# Patient Record
Sex: FEMALE | Race: WHITE | Employment: OTHER | URBAN - METROPOLITAN AREA
[De-identification: names, ages, dates, MRNs, and addresses within clinical notes are randomized per-mention and may not be internally consistent; named-entity substitution may affect disease eponyms.]

---

## 2022-01-17 ENCOUNTER — APPOINTMENT (OUTPATIENT)
Dept: CT IMAGING | Age: 77
End: 2022-01-17
Payer: MEDICARE

## 2022-01-17 ENCOUNTER — HOSPITAL ENCOUNTER (EMERGENCY)
Age: 77
Discharge: HOME | End: 2022-01-17
Payer: MEDICARE

## 2022-01-17 VITALS
SYSTOLIC BLOOD PRESSURE: 110 MMHG | OXYGEN SATURATION: 94 % | DIASTOLIC BLOOD PRESSURE: 58 MMHG | TEMPERATURE: 98 F | HEART RATE: 64 BPM | RESPIRATION RATE: 20 BRPM

## 2022-01-17 DIAGNOSIS — N30.00 ACUTE CYSTITIS WITHOUT HEMATURIA: ICD-10-CM

## 2022-01-17 DIAGNOSIS — R19.7 DIARRHEA, UNSPECIFIED TYPE: Primary | ICD-10-CM

## 2022-01-17 DIAGNOSIS — K64.9 BLEEDING HEMORRHOID: ICD-10-CM

## 2022-01-17 LAB
A/G RATIO: 1.5 (ref 1.1–2.2)
ABO/RH: NORMAL
ALBUMIN SERPL-MCNC: 3.4 G/DL (ref 3.4–5)
ALP BLD-CCNC: 69 U/L (ref 40–129)
ALT SERPL-CCNC: 9 U/L (ref 10–40)
ANION GAP SERPL CALCULATED.3IONS-SCNC: 9 MMOL/L (ref 3–16)
ANTIBODY SCREEN: NORMAL
AST SERPL-CCNC: 15 U/L (ref 15–37)
BACTERIA: ABNORMAL /HPF
BASOPHILS ABSOLUTE: 0 K/UL (ref 0–0.2)
BASOPHILS RELATIVE PERCENT: 0.5 %
BILIRUB SERPL-MCNC: 0.8 MG/DL (ref 0–1)
BILIRUBIN URINE: NEGATIVE
BLOOD, URINE: ABNORMAL
BUN BLDV-MCNC: 18 MG/DL (ref 7–20)
CALCIUM SERPL-MCNC: 9.2 MG/DL (ref 8.3–10.6)
CHLORIDE BLD-SCNC: 105 MMOL/L (ref 99–110)
CLARITY: ABNORMAL
CO2: 25 MMOL/L (ref 21–32)
COLOR: YELLOW
CREAT SERPL-MCNC: 1.1 MG/DL (ref 0.6–1.2)
EOSINOPHILS ABSOLUTE: 0.1 K/UL (ref 0–0.6)
EOSINOPHILS RELATIVE PERCENT: 0.9 %
EPITHELIAL CELLS, UA: 2 /HPF (ref 0–5)
GFR AFRICAN AMERICAN: 58
GFR NON-AFRICAN AMERICAN: 48
GLUCOSE BLD-MCNC: 115 MG/DL (ref 70–99)
GLUCOSE URINE: NEGATIVE MG/DL
HCT VFR BLD CALC: 33.1 % (ref 36–48)
HEMOGLOBIN: 10.6 G/DL (ref 12–16)
KETONES, URINE: NEGATIVE MG/DL
LEUKOCYTE ESTERASE, URINE: ABNORMAL
LIPASE: 21 U/L (ref 13–60)
LYMPHOCYTES ABSOLUTE: 2.1 K/UL (ref 1–5.1)
LYMPHOCYTES RELATIVE PERCENT: 22.6 %
MCH RBC QN AUTO: 27 PG (ref 26–34)
MCHC RBC AUTO-ENTMCNC: 32 G/DL (ref 31–36)
MCV RBC AUTO: 84.4 FL (ref 80–100)
MICROSCOPIC EXAMINATION: YES
MONOCYTES ABSOLUTE: 0.3 K/UL (ref 0–1.3)
MONOCYTES RELATIVE PERCENT: 3.5 %
NEUTROPHILS ABSOLUTE: 6.7 K/UL (ref 1.7–7.7)
NEUTROPHILS RELATIVE PERCENT: 72.5 %
NITRITE, URINE: NEGATIVE
PDW BLD-RTO: 16 % (ref 12.4–15.4)
PH UA: 6.5 (ref 5–8)
PLATELET # BLD: 208 K/UL (ref 135–450)
PMV BLD AUTO: 7.5 FL (ref 5–10.5)
POTASSIUM REFLEX MAGNESIUM: 4.1 MMOL/L (ref 3.5–5.1)
PROTEIN UA: 30 MG/DL
RBC # BLD: 3.92 M/UL (ref 4–5.2)
RBC UA: ABNORMAL /HPF (ref 0–4)
SODIUM BLD-SCNC: 139 MMOL/L (ref 136–145)
SPECIFIC GRAVITY UA: 1.01 (ref 1–1.03)
TOTAL PROTEIN: 5.7 G/DL (ref 6.4–8.2)
URINE REFLEX TO CULTURE: YES
URINE TYPE: ABNORMAL
UROBILINOGEN, URINE: 0.2 E.U./DL
WBC # BLD: 9.2 K/UL (ref 4–11)
WBC UA: >900 /HPF (ref 0–5)

## 2022-01-17 PROCEDURE — 81001 URINALYSIS AUTO W/SCOPE: CPT

## 2022-01-17 PROCEDURE — 87086 URINE CULTURE/COLONY COUNT: CPT

## 2022-01-17 PROCEDURE — 86850 RBC ANTIBODY SCREEN: CPT

## 2022-01-17 PROCEDURE — 6370000000 HC RX 637 (ALT 250 FOR IP): Performed by: PHYSICIAN ASSISTANT

## 2022-01-17 PROCEDURE — 99283 EMERGENCY DEPT VISIT LOW MDM: CPT

## 2022-01-17 PROCEDURE — 83690 ASSAY OF LIPASE: CPT

## 2022-01-17 PROCEDURE — 80053 COMPREHEN METABOLIC PANEL: CPT

## 2022-01-17 PROCEDURE — 86901 BLOOD TYPING SEROLOGIC RH(D): CPT

## 2022-01-17 PROCEDURE — 86900 BLOOD TYPING SEROLOGIC ABO: CPT

## 2022-01-17 PROCEDURE — 74176 CT ABD & PELVIS W/O CONTRAST: CPT

## 2022-01-17 PROCEDURE — 85025 COMPLETE CBC W/AUTO DIFF WBC: CPT

## 2022-01-17 RX ORDER — CEFDINIR 300 MG/1
300 CAPSULE ORAL 2 TIMES DAILY
Qty: 14 CAPSULE | Refills: 0 | Status: SHIPPED | OUTPATIENT
Start: 2022-01-17 | End: 2022-01-24

## 2022-01-17 RX ORDER — CEFDINIR 300 MG/1
300 CAPSULE ORAL ONCE
Status: COMPLETED | OUTPATIENT
Start: 2022-01-17 | End: 2022-01-17

## 2022-01-17 RX ADMIN — CEFDINIR 300 MG: 300 CAPSULE ORAL at 21:20

## 2022-01-18 NOTE — ED PROVIDER NOTES
[facility]      CHIEF COMPLAINT  Diarrhea (with some blood in stool)      SHARED SERVICE VISIT  Evaluated by TANIYA. My supervising physician was available for consultation. HISTORY OF PRESENT ILLNESS  Vonda Sun is a 64 y.o. female presents to the ED for evaluation of diarrhea. Patient states over the past week she developed constipation and started taking laxatives. She states that she has a history of hemorrhoids and attempted to manually disimpact herself. Following that attempt she did notice a small amount of bright red blood and irritation to her hemorrhoid. No history of GI bleeds in the past no anticoagulation. Patient proceeded to consume the whole bottle of laxatives over the week and now over the past 4 days she has developed diarrhea. She states that she continues to have diarrhea over the past few days and does wear depends regularly. She states that now over the past few days she has started taking Imodium to try to stop the diarrhea. No fevers chills or abdominal pain no abdominal tenderness whatsoever. No urinary concerns. No dysuria or increased frequency. Patient states she has been attempting to hydrate herself with water regularly. States that the bright red but has not resolved. No other complaints, modifying factors or associated symptoms. Nursing notes reviewed. Past Medical History:   Diagnosis Date    Acid reflux     Anxiety     Depression     Hiatal hernia     Hyperlipidemia     Hypertension     MI (myocardial infarction) ([facility] [address] 2000, 2003    Thyroid disease     Vertigo      Past Surgical History:   Procedure Laterality Date    ANGIOPLASTY      CARDIAC SURGERY      bypass and stent    HYSTERECTOMY       No family history on file. Social History     Socioeconomic History    Marital status:       Spouse name: Not on file    Number of children: Not on file    Years of education: Not on file    Highest education level: Not on file   Occupational History    Not on file   Tobacco Use    Smoking status: Not on file    Smokeless tobacco: Not on file   Substance and Sexual Activity    Alcohol use: Not on file    Drug use: Not on file    Sexual activity: Not on file   Other Topics Concern    Not on file   Social History Narrative    Not on file     Social Determinants of Health     Financial Resource Strain:     Difficulty of Paying Living Expenses: Not on file   Food Insecurity:     Worried About Running Out of Food in the Last Year: Not on file    Cyn of Food in the Last Year: Not on file   Transportation Needs:     Lack of Transportation (Medical): Not on file    Lack of Transportation (Non-Medical):  Not on file   Physical Activity:     Days of Exercise per Week: Not on file    Minutes of Exercise per Session: Not on file   Stress:     Feeling of Stress : Not on file   Social Connections:     Frequency of Communication with Friends and Family: Not on file    Frequency of Social Gatherings with Friends and Family: Not on file    Attends [employer] Services: Not on file    Active Member of [address] [place] [address] or Organizations: Not on file    Attends Club or Organization Meetings: Not on file    Marital Status: Not on file   Intimate Partner Violence:     Fear of Current or Ex-Partner: Not on file    Emotionally Abused: Not on file    Physically Abused: Not on file    Sexually Abused: Not on file   Housing Stability:     Unable to Pay for Housing in the Last Year: Not on file    Number of [place] in the Last Year: Not on file    Unstable Housing in the Last Year: Not on file     Current Facility-Administered Medications   Medication Dose Route Frequency Provider Last Rate Last Admin    cefdinir (OMNICEF) capsule 300 mg  300 mg Oral Once Susana Miller PA-C         Current Outpatient Medications   Medication Sig Dispense Refill    cefdinir (OMNICEF) 300 MG capsule Take 1 capsule by mouth 2 times daily for 7 days 14 capsule 0    metoprolol (TOPROL-XL) 25 MG XL tablet Take 1 tablet by mouth 2 times daily. 30 tablet 0    clopidogrel (PLAVIX) 75 MG tablet Take 1 tablet by mouth daily. Start in 1 week 30 tablet 0    omeprazole (PRILOSEC) 20 MG capsule Take 1 capsule by mouth 2 times daily. 30 capsule 0    topiramate (TOPAMAX) 25 MG tablet Take 25 mg by mouth 2 times daily.  amitriptyline (ELAVIL) 50 MG tablet Take 50 mg by mouth nightly.  benazepril (LOTENSIN) 20 MG tablet Take 20 mg by mouth daily.  simvastatin (ZOCOR) 80 MG tablet Take 80 mg by mouth nightly.  isosorbide mononitrate (IMDUR) 30 MG CR tablet Take 30 mg by mouth daily.  levothyroxine (SYNTHROID) 50 MCG tablet Take 50 mcg by mouth Daily.  triamterene-hydrochlorothiazide (MAXZIDE-25) 37.5-25 MG per tablet Take 1 tablet by mouth daily.  ALPRAZolam (XANAX) 1 MG tablet Take 1 mg by mouth nightly as needed for Sleep. Allergies   Allergen Reactions    Demerol Hcl [Meperidine] Palpitations    Morphine Palpitations       REVIEW OF SYSTEMS  10 systems reviewed, pertinent positives per HPI otherwise noted to be negative    PHYSICAL EXAM  BP (!) 104/46   Pulse 62   Temp 98 °F (36.7 °C) (Temporal)   Resp 18   SpO2 94%   GENERAL APPEARANCE: Awake and alert. Cooperative. HEAD: Normocephalic. Atraumatic. EYES: EOM's grossly intact. ENT: Mucous membranes are moist.   NECK: Supple. HEART: RRR. No murmurs. LUNGS: Respirations unlabored. CTAB. Good air exchange. Speaking comfortably in full sentences. ABDOMEN: Soft. Non-distended. Non-tender. No guarding or rebound. No masses. No organomegaly. RECTAL: External hemorrhoids appreciated with superficial abrasions to the bilateral buttocks. No melena appreciated. EXTREMITIES: No peripheral edema. Moves all extremities equally. All extremities neurovascularly intact. SKIN: Warm and dry. No acute rashes. NEUROLOGICAL: Alert and oriented. CN's 2-12 intact. No gross facial drooping. Strength 5/5, sensation intact. PSYCHIATRIC: Normal mood and affect. RADIOLOGY  CT ABDOMEN PELVIS WO CONTRAST Additional Contrast? None   Final Result   Addendum 1 of 1   ADDENDUM:   The indeterminate left renal lesion was mentioned in the findings but not    the   impression. That may reflect presence of a complex cyst, but a    nonemergent   follow-up dedicated renal MRI or CT is recommended to better characterize   that. Final   No abnormality identified to explain diarrhea. A right ureteral stent is in place with mild right-sided hydronephrosis. Urothelial thickening on the right is noted, along with perinephric,   periureteral, and pericystic fat stranding, findings which are concerning for   inflammation/infection.                 LABS  Labs Reviewed   CBC WITH AUTO DIFFERENTIAL - Abnormal; Notable for the following components:       Result Value    RBC 3.92 (*)     Hemoglobin 10.6 (*)     Hematocrit 33.1 (*)     RDW 16.0 (*)     All other components within normal limits    Narrative:     Performed at:  [facility]  [address] [facility] [address] [address]   Phone [phone]   COMPREHENSIVE METABOLIC PANEL W/ REFLEX TO MG FOR LOW K - Abnormal; Notable for the following components:    Glucose 115 (*)     GFR Non- 48 (*)     GFR  58 (*)     Total Protein 5.7 (*)     ALT 9 (*)     All other components within normal limits    Narrative:     Performed at:  [facility]  [address] [address] [address]   Phone [phone]   URINE RT REFLEX TO CULTURE - Abnormal; Notable for the following components:    Clarity, UA TURBID (*)     Blood, Urine LARGE (*)     Protein, UA 30 (*)     Leukocyte Esterase, Urine LARGE (*)     All other components within normal limits    Narrative:     Performed at:  [facility]  [address] [facility]) [facility] [address]   Phone [phone]   MICROSCOPIC URINALYSIS - Abnormal; Notable for the following components:    Bacteria, UA 2+ (*)     WBC, UA >900 (*)     All other components within normal limits    Narrative:     Performed at:  [facility]  [address] [place] [address]   Phone [phone]   CULTURE, URINE   LIPASE    Narrative:     Performed at:  [facility]  [address] [place] [address]   Phone [phone]   TYPE AND SCREEN    Narrative:     Performed at:  [facility]  [address] [place] [address]   Phone [phone]       PROCEDURES  Unless otherwise noted below, none  Procedures      CRITICAL CARE TIME  The total critical care time spent while evaluating and treating this patient was 0 minutes. This excludes time spent doing separately billable procedures. This includes time at the bedside, data interpretation, medication management, obtaining critical history from collateral sources if the patient is unable to provide it directly, and physician consultation. Specifics of interventions taken and potentially life-threatening diagnostic considerations are listed above in the medical decision making. MDM  MDM  47-year-old female presented the ED for evaluation of diarrhea following consumption of laxatives. Initially had constipation and has history of hemorrhoids with home attempted manual disimpaction. She says she had bright red blood following's episode most likely due to the irritation of her hemorrhoid. No bleeding at this time. On arrival to ED patient vitals are within normal limits with a hemoglobin 10.6 which is at her baseline. No significant showed abnormalities or signs of dehydration. No kidney injury appreciated. BUN 18. Creatinine 1.1. Leukocytosis.   Abdomen soft nontender we will plan to get a CT to rule out any acute intra-abdominal abnormality. Patient's urinalysis was remarkable for urinary tract infection. She has no leukocytosis and afebrile nontachycardic. Nontoxic-appearing. CT was remarkable for concerns for perinephric stranding. Patient has no flank tenderness. And is well-appearing. On exam of her rectum there is hemorrhoid appreciated with a expectation follow-up on the frequent diarrhea. Patient had vomiting here in the emergency department and states there is no blood. I did not appreciate any melena on my exam.  Time most likely causes due to the external hemorrhage given the very bright red in nature as well as the physical exam.  Patient's BUN is 18 as well. Given this urinary tract infection I did offer patient observartion for repeat lab work as well as antibiotics. Patient states that she request to be discharged home. Patient states that she lives with her grandson and plans on contacting her primary care provider on discharge. I do believe this is within reason. Discussed with the patient the risk of discharge and she was able to express understanding of the risk and return precautions. Discussed with her that this could worsen and if she would develop any fevers or pain or nausea vomiting or blood in her stool recommend she return emergency department for reevaluation. Patient was here in the emergency department for 5 hours with no episodes of rectal bleeding or diarrhea. DISPOSITION  Patient was discharged to home in good condition. CLINICAL IMPRESSION  1. Diarrhea, unspecified type    2. Bleeding hemorrhoid    3.  Acute cystitis without hematuria            Abby Page PA-C  01/17/22 4352

## 2022-01-19 LAB — URINE CULTURE, ROUTINE: NORMAL

## 2022-07-03 ENCOUNTER — HOSPITAL ENCOUNTER (EMERGENCY)
Age: 77
Discharge: HOME | End: 2022-07-03
Attending: EMERGENCY MEDICINE
Payer: MEDICARE

## 2022-07-03 ENCOUNTER — APPOINTMENT (OUTPATIENT)
Dept: CT IMAGING | Age: 77
End: 2022-07-03
Payer: MEDICARE

## 2022-07-03 VITALS
RESPIRATION RATE: 16 BRPM | HEART RATE: 65 BPM | TEMPERATURE: 98.1 F | HEIGHT: 64 IN | BODY MASS INDEX: 28.15 KG/M2 | OXYGEN SATURATION: 100 % | WEIGHT: 164.9 LBS | SYSTOLIC BLOOD PRESSURE: 165 MMHG | DIASTOLIC BLOOD PRESSURE: 60 MMHG

## 2022-07-03 DIAGNOSIS — R19.7 DIARRHEA, UNSPECIFIED TYPE: Primary | ICD-10-CM

## 2022-07-03 DIAGNOSIS — Z93.6 NEPHROSTOMY STATUS (HCC): ICD-10-CM

## 2022-07-03 LAB
A/G RATIO: 1.8 (ref 1.1–2.2)
ALBUMIN SERPL-MCNC: 4 G/DL (ref 3.4–5)
ALP BLD-CCNC: 74 U/L (ref 40–129)
ALT SERPL-CCNC: 17 U/L (ref 10–40)
ANION GAP SERPL CALCULATED.3IONS-SCNC: 12 MMOL/L (ref 3–16)
AST SERPL-CCNC: 20 U/L (ref 15–37)
BACTERIA: ABNORMAL /HPF
BASOPHILS ABSOLUTE: 0 K/UL (ref 0–0.2)
BASOPHILS RELATIVE PERCENT: 0.5 %
BILIRUB SERPL-MCNC: 0.4 MG/DL (ref 0–1)
BILIRUBIN URINE: NEGATIVE
BLOOD, URINE: NEGATIVE
BUN BLDV-MCNC: 19 MG/DL (ref 7–20)
C DIFF TOXIN/ANTIGEN: NORMAL
CALCIUM SERPL-MCNC: 9.5 MG/DL (ref 8.3–10.6)
CHLORIDE BLD-SCNC: 99 MMOL/L (ref 99–110)
CLARITY: CLEAR
CO2: 27 MMOL/L (ref 21–32)
COLOR: YELLOW
CREAT SERPL-MCNC: 1.4 MG/DL (ref 0.6–1.2)
EOSINOPHILS ABSOLUTE: 0.1 K/UL (ref 0–0.6)
EOSINOPHILS RELATIVE PERCENT: 1.6 %
EPITHELIAL CELLS, UA: 1 /HPF (ref 0–5)
GFR AFRICAN AMERICAN: 44
GFR NON-AFRICAN AMERICAN: 36
GLUCOSE BLD-MCNC: 112 MG/DL (ref 70–99)
GLUCOSE URINE: NEGATIVE MG/DL
HCT VFR BLD CALC: 31.3 % (ref 36–48)
HEMOGLOBIN: 10.3 G/DL (ref 12–16)
HYALINE CASTS: 2 /LPF (ref 0–8)
KETONES, URINE: NEGATIVE MG/DL
LEUKOCYTE ESTERASE, URINE: ABNORMAL
LIPASE: 52 U/L (ref 13–60)
LYMPHOCYTES ABSOLUTE: 2 K/UL (ref 1–5.1)
LYMPHOCYTES RELATIVE PERCENT: 24.6 %
MCH RBC QN AUTO: 27.2 PG (ref 26–34)
MCHC RBC AUTO-ENTMCNC: 32.8 G/DL (ref 31–36)
MCV RBC AUTO: 83.1 FL (ref 80–100)
MICROSCOPIC EXAMINATION: YES
MONOCYTES ABSOLUTE: 0.5 K/UL (ref 0–1.3)
MONOCYTES RELATIVE PERCENT: 5.6 %
NEUTROPHILS ABSOLUTE: 5.6 K/UL (ref 1.7–7.7)
NEUTROPHILS RELATIVE PERCENT: 67.7 %
NITRITE, URINE: NEGATIVE
PDW BLD-RTO: 15.5 % (ref 12.4–15.4)
PH UA: 6 (ref 5–8)
PLATELET # BLD: 278 K/UL (ref 135–450)
PMV BLD AUTO: 7.7 FL (ref 5–10.5)
POTASSIUM SERPL-SCNC: 3 MMOL/L (ref 3.5–5.1)
PROTEIN UA: NEGATIVE MG/DL
RBC # BLD: 3.77 M/UL (ref 4–5.2)
RBC UA: 2 /HPF (ref 0–4)
SODIUM BLD-SCNC: 138 MMOL/L (ref 136–145)
SPECIFIC GRAVITY UA: 1.01 (ref 1–1.03)
TOTAL PROTEIN: 6.2 G/DL (ref 6.4–8.2)
URINE REFLEX TO CULTURE: ABNORMAL
URINE TYPE: ABNORMAL
UROBILINOGEN, URINE: 0.2 E.U./DL
WBC # BLD: 8.3 K/UL (ref 4–11)
WBC UA: 6 /HPF (ref 0–5)

## 2022-07-03 PROCEDURE — 6370000000 HC RX 637 (ALT 250 FOR IP): Performed by: PHYSICIAN ASSISTANT

## 2022-07-03 PROCEDURE — 80053 COMPREHEN METABOLIC PANEL: CPT

## 2022-07-03 PROCEDURE — 99284 EMERGENCY DEPT VISIT MOD MDM: CPT

## 2022-07-03 PROCEDURE — 81001 URINALYSIS AUTO W/SCOPE: CPT

## 2022-07-03 PROCEDURE — 85025 COMPLETE CBC W/AUTO DIFF WBC: CPT

## 2022-07-03 PROCEDURE — 87449 NOS EACH ORGANISM AG IA: CPT

## 2022-07-03 PROCEDURE — 36415 COLL VENOUS BLD VENIPUNCTURE: CPT

## 2022-07-03 PROCEDURE — 87505 NFCT AGENT DETECTION GI: CPT

## 2022-07-03 PROCEDURE — 74176 CT ABD & PELVIS W/O CONTRAST: CPT

## 2022-07-03 PROCEDURE — 83690 ASSAY OF LIPASE: CPT

## 2022-07-03 PROCEDURE — 87324 CLOSTRIDIUM AG IA: CPT

## 2022-07-03 RX ORDER — LACTOBACILLUS RHAMNOSUS GG 10B CELL
1 CAPSULE ORAL ONCE
Status: COMPLETED | OUTPATIENT
Start: 2022-07-03 | End: 2022-07-03

## 2022-07-03 RX ORDER — SACCHAROMYCES BOULARDII 250 MG
250 CAPSULE ORAL 2 TIMES DAILY
Qty: 14 CAPSULE | Refills: 0 | Status: SHIPPED | OUTPATIENT
Start: 2022-07-03 | End: 2022-07-10

## 2022-07-03 RX ORDER — POTASSIUM CHLORIDE 20 MEQ/1
40 TABLET, EXTENDED RELEASE ORAL ONCE
Status: COMPLETED | OUTPATIENT
Start: 2022-07-03 | End: 2022-07-03

## 2022-07-03 RX ADMIN — POTASSIUM CHLORIDE 40 MEQ: 20 TABLET, EXTENDED RELEASE ORAL at 17:58

## 2022-07-03 RX ADMIN — Medication 1 CAPSULE: at 20:08

## 2022-07-03 ASSESSMENT — PAIN SCALES - GENERAL
PAINLEVEL_OUTOF10: 0
PAINLEVEL_OUTOF10: 0

## 2022-07-03 ASSESSMENT — ENCOUNTER SYMPTOMS
VOMITING: 0
DIARRHEA: 1
SHORTNESS OF BREATH: 0
COLOR CHANGE: 0
BLOOD IN STOOL: 0
ABDOMINAL PAIN: 0

## 2022-07-03 ASSESSMENT — PAIN - FUNCTIONAL ASSESSMENT: PAIN_FUNCTIONAL_ASSESSMENT: NONE - DENIES PAIN

## 2022-07-03 NOTE — ED NOTES
Pt to ED via EMS with c/o diarrhea that started 1 week ago while she was in the hospital at [address] [place] [address] also states she had right kidney stent placed and her nephrostomy tube \"came loose\" several nights ago and has \"not been draining right since\"      Karin Palomares, BRITTNEY  07/03/22 5010

## 2022-07-03 NOTE — ED NOTES
Pt incontinent of moderate amount of liquid brown stool. Melvi care provided, new depends and linen changed. Redness and excoriation noted to coccyx. Area cleansed and sacral mepilex applied. Pt tolerated well. Stool sample obtained and sent to lab as ordered by provider.       Jayashree Wang RN  07/03/22 9378

## 2022-07-03 NOTE — ED PROVIDER NOTES
Attending Supervisory Note/Shared Visit   I have personally performed a face to face diagnostic evaluation on this patient. I have reviewed the mid-levels findings and agree. History and Exam by me shows an alert white female no acute distress. Transported by EMS. Ongoing diarrhea for the last week worse today. Was just in the hospital at [facility].  Apparently unable to place a renal stent on the right and had a nephrostomy tube placed. The tube came apart several nights ago and they had to reattach it. She does not think it pulled out. Since then she does not think there is been as much urine output. She has about 350 cc of urine in the bag now. She states she emptied it around 4 AM.  She states she has not voided any urine from her bladder/urethra. No abdominal pain. General: Alert moderately obese female in no acute distress. Heart: Regular rate and rhythm. No murmurs or gallops noted. Lungs: Breath sounds equal bilaterally and clear. Abdomen: Obese, soft, nontender. No masses organomegaly. Bowel sounds normal.  Nephrostomy tube present in right flank. Lab reviewed. Urinalysis shows 6 white cells and 2 red cells. H&H of 10.3 and 31.3. White blood count 8300 with 68 neutrophils and 25 lymphs. BUN of 19 with a creatinine of 1.4. Potassium of 3.0. Bicarb of 27 with an anion gap of 12. Liver enzymes and lipase are normal.    CT abdomen pelvis without contrast: Right nephrostomy catheter in the middle pole infundibulum with mild hydronephrosis. No comparison images to assess stability. There is mild redundancy of the extrarenal course of the nephrostomy tube in the subcutaneous and retroperitoneal soft tissues. Right nephrolithiasis including 2 punctate calculi at the level of the UPJ. No acute bowel pathology. Discussed with urology. Tube is functioning, it may be repositioned per that discussion.   It can be done on Tuesday as an outpatient by her urologist, or it can be done here if the patient wants to be admitted. No definite indication for admission. Her creatinine is trending around her baseline. The nephrostomy tube is actively draining. Patient will be discharged to follow-up as an outpatient.     (Please note that portions of this note were completed with a voice recognition program.  Efforts were made to edit the dictations but occasionally words are mis-transcribed.)    Mckenzie Moser MD  Attending Emergency Physician        Asad Elliott MD  07/03/22 2003

## 2022-07-03 NOTE — ED PROVIDER NOTES
[address] [facility]      Pt Name: David Izaguirre  MRN: [id number]  [place] 1945  Date of evaluation: 7/3/2022  Provider: DIAMANTE Zhang    This patient was seen and evaluated by the attending physician Va Saini MD.    [address] [facility]       Chief Complaint   Patient presents with    Diarrhea     Pt to ED via EMS with c/o diarrhea that started 1 week ago while she was in the hospital at [address] [place] [address] also states she had right kidney stent placed and her nephrostomy tube \"came loose\" several nights ago and has \"not been draining right since\"     Other       CRITICAL CARE TIME   I performed a total Critical Care time of  31 minutes, excluding separately reportable procedures. There was a high probability of clinically significant/life threatening deterioration in the patient's condition which required my urgent intervention. Not limited to multiple reexaminations, discussions with attending physician and consultants. HISTORY OF PRESENT ILLNESS  (Location/Symptom, Timing/Onset, Context/Setting, Quality, Duration, Modifying Factors, Severity.)   David Izaguirre is a 76 y.o. female who presents to the emergency department and of diarrhea and concern for her nephrostomy tube. Her son is at the bedside. She has a history of diarrhea but states that this episode started a couple weeks ago. She was admitted to the [facility] in June 20 sees Dr. Toney Marquez of allergy. She tells me she had 9 stents in her right kidney in the past with history of UPJ obstruction. She states that they attempted to replace the stent but were unsuccessful. The stent was removed. Consequently she had a nephrostomy tube placed. She states that a couple nights ago somehow the tubing got pulled and had to take part of it back together she feels like has not been draining well today.   She states she emptied at 4:30 AM and has only had (TOPROL-XL) 25 MG XL tablet Take 1 tablet by mouth 2 times daily. , Disp-30 tablet, R-0      clopidogrel (PLAVIX) 75 MG tablet Take 1 tablet by mouth daily. Start in 1 week, Disp-30 tablet, R-0      omeprazole (PRILOSEC) 20 MG capsule Take 1 capsule by mouth 2 times daily. , Disp-30 capsule, R-0      topiramate (TOPAMAX) 25 MG tablet Take 25 mg by mouth 2 times daily. amitriptyline (ELAVIL) 50 MG tablet Take 50 mg by mouth nightly. benazepril (LOTENSIN) 20 MG tablet Take 20 mg by mouth daily. simvastatin (ZOCOR) 80 MG tablet Take 80 mg by mouth nightly. isosorbide mononitrate (IMDUR) 30 MG CR tablet Take 30 mg by mouth daily. levothyroxine (SYNTHROID) 50 MCG tablet Take 50 mcg by mouth Daily. triamterene-hydrochlorothiazide (MAXZIDE-25) 37.5-25 MG per tablet Take 1 tablet by mouth daily. ALPRAZolam (XANAX) 1 MG tablet Take 1 mg by mouth nightly as needed for Sleep. ALLERGIES     Demerol hcl [meperidine] and Morphine    FAMILY HISTORY     History reviewed. No pertinent family history. No family status information on file. SOCIAL HISTORY      reports that she has never smoked. She has never used smokeless tobacco. She reports previous alcohol use. She reports previous drug use. PHYSICAL EXAM    (up to 7 for level 4, 8 or more for level 5)     ED Triage Vitals [07/03/22 1615]   BP Temp Temp Source Heart Rate Resp SpO2 Height Weight   (!) 158/51 98.4 °F (36.9 °C) Oral 62 15 97 % 5' 4\" (1.626 m) 164 lb 14.5 oz (74.8 kg)       Physical Exam  Vitals and nursing note reviewed. Constitutional:       Appearance: Normal appearance. HENT:      Head: Normocephalic and atraumatic. Mouth/Throat:      Mouth: Mucous membranes are moist.   Eyes:      Pupils: Pupils are equal, round, and reactive to light. Cardiovascular:      Rate and Rhythm: Normal rate. Pulses: Normal pulses. Pulmonary:      Effort: Pulmonary effort is normal. No respiratory distress. Abdominal:      Tenderness: There is no abdominal tenderness. There is no guarding or rebound. Musculoskeletal:         General: Normal range of motion. Cervical back: Normal range of motion. Skin:     General: Skin is warm. Neurological:      General: No focal deficit present. Mental Status: She is alert and oriented to person, place, and time. Psychiatric:         Mood and Affect: Mood normal.         Behavior: Behavior normal.         DIAGNOSTIC RESULTS     RADIOLOGY:   Non-plain film images such as CT, Ultrasound and MRI are read by the radiologist. Plain radiographic images are visualized and preliminarily interpreted by DIAMANTE Foster with the below findings:    Reviewed radiologist's interpretation. Interpretation per the Radiologist below, if available at the time of this note:    CT ABDOMEN PELVIS WO CONTRAST   Preliminary Result   1. Right nephrostomy catheter in a middle pole infundibulum with mild   hydronephrosis. No comparison imaging to assess stability. There is mild   redundancy of the extrarenal course of the nephrostomy catheter in the   subcutaneous and retroperitoneal soft tissues. 2. Right nephrolithiasis including two punctate calculi at level of the UPJ. 3. No acute bowel pathology. Colonic diverticulosis.                LABS:  Labs Reviewed   CBC WITH AUTO DIFFERENTIAL - Abnormal; Notable for the following components:       Result Value    RBC 3.77 (*)     Hemoglobin 10.3 (*)     Hematocrit 31.3 (*)     RDW 15.5 (*)     All other components within normal limits   COMPREHENSIVE METABOLIC PANEL - Abnormal; Notable for the following components:    Potassium 3.0 (*)     Glucose 112 (*)     CREATININE 1.4 (*)     GFR Non- 36 (*)     GFR  44 (*)     Total Protein 6.2 (*)     All other components within normal limits   URINALYSIS WITH REFLEX TO CULTURE - Abnormal; Notable for the following components:    Leukocyte Esterase, Urine SMALL (*)     All other components within normal limits   MICROSCOPIC URINALYSIS - Abnormal; Notable for the following components:    WBC, UA 6 (*)     All other components within normal limits   C DIFF TOXIN/ANTIGEN    Narrative:     ORDER#: [id number]                          ORDERED BY: HALLIE SANTOS  SOURCE: Stool                              COLLECTED:  07/03/22 18:18  ANTIBIOTICS AT [facility].:                      RECEIVED :  07/03/22 18:26  Collect White vial (sterile container)   GASTROINTESTINAL PANEL, MOLECULAR   LIPASE       All other labs were within normal range or not returned as of this dictation. EMERGENCY DEPARTMENT COURSE and DIFFERENTIAL DIAGNOSIS/MDM:   Vitals:    Vitals:    07/03/22 1815 07/03/22 1858 07/03/22 1918 07/03/22 2035   BP: (!) 176/61 (!) 171/51 (!) 170/50 (!) 165/60   Pulse: 60 63 63 65   Resp: 25 17 15 16   Temp:  98.1 °F (36.7 °C)  98.1 °F (36.7 °C)   TempSrc:  Oral  Oral   SpO2:  97% 98% 100%   Weight:       Height:         I discussed with Jostin Carreno and/or family the exam results, diagnosis, care, prognosis, reasons to return and the importance of follow up. Patient and/or family is in full agreement with plan and all questions have been answered. Specific discharge instructions explained, including reasons to return to the emergency department. Jostin Carreno is well appearing, non-toxic, and afebrile at the time of discharge. Patient is afebrile not tachycardic blood pressure elevated 500 systolic on arrival.  Not hypoxic. She denies pain in her flank or abdomen no tenderness on her abdomen. Complaining of diarrhea that is been going on for some time. She is tried over-the-counter medications including Imodium and Pepto-Bismol. She is negative for C. difficile has been on antibiotics recently. Discussed trying a probiotic and following with GI and primary care. The patient would like to go home.   I did contact urology and discussed the case with Dr. Alfa wagner CT scan imaging. On my reevaluation initially she only had 250 cc in her nephrostomy bag however on reevaluation the bag is full. It is draining. Urology advised that the patient as it is draining may need repositioning but that she could follow-up Tuesday with her urologist.  Patient expressed understanding and agreement. She understands to return if the bag stops draining or she develops fevers vomiting or pain. I estimate there is LOW risk for ACUTE APPENDICITIS, BOWEL OBSTRUCTION, CHOLECYSTITIS, DIVERTICULITIS, INCARCERATED HERNIA, PANCREATITIS, PERFORATED BOWEL, BOWEL ISCHEMIA, GONADAL TORSION, OR CARDIAC ISCHEMIA, thus I consider the discharge disposition reasonable. Also, there is no evidence or peritonitis, sepsis, or toxicity. CONSULTS:  IP CONSULT TO UROLOGY    PROCEDURES:  Procedures      FINAL IMPRESSION      1. Diarrhea, unspecified type    2. Nephrostomy status [facility])          DISPOSITION/PLAN   DISPOSITION Decision To Discharge 07/03/2022 07:59:58 PM      PATIENT REFERRED TO:  Rona Mon MD  [address] [place] [address].   [address] [place] [address]  [phone]    Call in 2 days  For follow up      DISCHARGE MEDICATIONS:  Discharge Medication List as of 7/3/2022  8:06 PM      START taking these medications    Details   saccharomyces boulardii (FLORASTOR) 250 MG capsule Take 1 capsule by mouth 2 times daily for 7 days, Disp-14 capsule, R-0Print             (Please note that portions of this note were completed with a voice recognition program.  Efforts were made to edit the dictations but occasionally words are mis-transcribed.)    [place], [address], [place]  07/03/22 3096

## 2022-07-04 LAB — GI BACTERIAL PATHOGENS BY PCR: NORMAL

## 2022-07-04 NOTE — ED NOTES
Provider order placed for patient's discharge. Provider reviewed decision to discharge with the patient. Discharge paperwork and any prescriptions were reviewed with the patient. Patient verbalized understanding of discharge education and any prescriptions and has no further questions or further needs at this time. Patient left with all personal belongings and was stable upon departure. Patient thanked for choosing [facility] ([facility]) and informed to return should any need arise.        Divine Moran RN  07/03/22 8379

## 2022-12-13 ENCOUNTER — APPOINTMENT (OUTPATIENT)
Dept: CT IMAGING | Age: 77
DRG: 871 | End: 2022-12-13
Payer: MEDICARE

## 2022-12-13 ENCOUNTER — HOSPITAL ENCOUNTER (INPATIENT)
Age: 77
LOS: 4 days | Discharge: NURSING, REHAB OR LONG-TERM CARE | DRG: 871 | End: 2022-12-17
Attending: EMERGENCY MEDICINE | Admitting: STUDENT IN AN ORGANIZED HEALTH CARE EDUCATION/TRAINING PROGRAM
Payer: MEDICARE

## 2022-12-13 DIAGNOSIS — R65.20 SEVERE SEPSIS (HCC): Primary | ICD-10-CM

## 2022-12-13 DIAGNOSIS — A41.9 SEVERE SEPSIS (HCC): Primary | ICD-10-CM

## 2022-12-13 DIAGNOSIS — J18.9 PNEUMONIA OF RIGHT MIDDLE LOBE DUE TO INFECTIOUS ORGANISM: ICD-10-CM
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
## 2022-12-13 PROBLEM — L89.90 DECUBITAL ULCER: Status: ACTIVE | Noted: 2022-01-01

## 2022-12-13 LAB
A/G RATIO: 0.9 (ref 1.1–2.2)
ALBUMIN SERPL-MCNC: 2.3 G/DL (ref 3.4–5)
ALP BLD-CCNC: 141 U/L (ref 40–129)
ALT SERPL-CCNC: 146 U/L (ref 10–40)
ANION GAP SERPL CALCULATED.3IONS-SCNC: 13 MMOL/L (ref 3–16)
AST SERPL-CCNC: 102 U/L (ref 15–37)
BASE EXCESS VENOUS: -2.9 MMOL/L
BASOPHILS ABSOLUTE: 0 K/UL (ref 0–0.2)
BASOPHILS RELATIVE PERCENT: 0 %
BILIRUB SERPL-MCNC: 0.5 MG/DL (ref 0–1)
BUN BLDV-MCNC: 47 MG/DL (ref 7–20)
CALCIUM SERPL-MCNC: 8.8 MG/DL (ref 8.3–10.6)
CARBOXYHEMOGLOBIN: 1.1 %
CHLORIDE BLD-SCNC: 103 MMOL/L (ref 99–110)
CO2: 22 MMOL/L (ref 21–32)
CREAT SERPL-MCNC: 0.9 MG/DL (ref 0.6–1.2)
EOSINOPHILS ABSOLUTE: 0 K/UL (ref 0–0.6)
EOSINOPHILS RELATIVE PERCENT: 0 %
GFR SERPL CREATININE-BSD FRML MDRD: >60 ML/MIN/{1.73_M2}
GLUCOSE BLD-MCNC: 196 MG/DL (ref 70–99)
HCO3 VENOUS: 21 MMOL/L (ref 23–29)
HCT VFR BLD CALC: 33.2 % (ref 36–48)
HEMOGLOBIN: 10.5 G/DL (ref 12–16)
LACTIC ACID: 3.8 MMOL/L (ref 0.4–2)
LYMPHOCYTES ABSOLUTE: 1.3 K/UL (ref 1–5.1)
LYMPHOCYTES RELATIVE PERCENT: 6.9 %
MCH RBC QN AUTO: 27.6 PG (ref 26–34)
MCHC RBC AUTO-ENTMCNC: 31.7 G/DL (ref 31–36)
MCV RBC AUTO: 87.1 FL (ref 80–100)
METHEMOGLOBIN VENOUS: 0.5 %
MONOCYTES ABSOLUTE: 0.4 K/UL (ref 0–1.3)
MONOCYTES RELATIVE PERCENT: 2.3 %
NEUTROPHILS ABSOLUTE: 17.2 K/UL (ref 1.7–7.7)
NEUTROPHILS RELATIVE PERCENT: 90.8 %
O2 SAT, VEN: 96 %
O2 THERAPY: ABNORMAL
PCO2, VEN: 32.4 MMHG (ref 40–50)
PDW BLD-RTO: 17.1 % (ref 12.4–15.4)
PH VENOUS: 7.42 (ref 7.35–7.45)
PLATELET # BLD: 188 K/UL (ref 135–450)
PMV BLD AUTO: 7.2 FL (ref 5–10.5)
PO2, VEN: 79 MMHG
POTASSIUM REFLEX MAGNESIUM: 4.2 MMOL/L (ref 3.5–5.1)
PRO-BNP: ABNORMAL PG/ML (ref 0–449)
RBC # BLD: 3.81 M/UL (ref 4–5.2)
SARS-COV-2, NAAT: NOT DETECTED
SODIUM BLD-SCNC: 138 MMOL/L (ref 136–145)
TCO2 CALC VENOUS: 22 MMOL/L
TOTAL PROTEIN: 5 G/DL (ref 6.4–8.2)
WBC # BLD: 18.9 K/UL (ref 4–11)

## 2022-12-13 PROCEDURE — 99285 EMERGENCY DEPT VISIT HI MDM: CPT

## 2022-12-13 PROCEDURE — 2580000003 HC RX 258: Performed by: EMERGENCY MEDICINE

## 2022-12-13 PROCEDURE — 83605 ASSAY OF LACTIC ACID: CPT

## 2022-12-13 PROCEDURE — 6360000002 HC RX W HCPCS: Performed by: NURSE PRACTITIONER

## 2022-12-13 PROCEDURE — 6370000000 HC RX 637 (ALT 250 FOR IP): Performed by: NURSE PRACTITIONER

## 2022-12-13 PROCEDURE — 87635 SARS-COV-2 COVID-19 AMP PRB: CPT

## 2022-12-13 PROCEDURE — 85025 COMPLETE CBC W/AUTO DIFF WBC: CPT

## 2022-12-13 PROCEDURE — 83880 ASSAY OF NATRIURETIC PEPTIDE: CPT

## 2022-12-13 PROCEDURE — 93005 ELECTROCARDIOGRAM TRACING: CPT | Performed by: EMERGENCY MEDICINE

## 2022-12-13 PROCEDURE — 1200000000 HC SEMI PRIVATE

## 2022-12-13 PROCEDURE — 80053 COMPREHEN METABOLIC PANEL: CPT

## 2022-12-13 PROCEDURE — 82803 BLOOD GASES ANY COMBINATION: CPT

## 2022-12-13 PROCEDURE — 71250 CT THORAX DX C-: CPT

## 2022-12-13 RX ORDER — HYDROCODONE BITARTRATE AND ACETAMINOPHEN 5; 325 MG/1; MG/1
1 TABLET ORAL EVERY 6 HOURS PRN
COMMUNITY

## 2022-12-13 RX ORDER — CASTOR OIL AND BALSAM, PERU 788; 87 MG/G; MG/G
1 OINTMENT TOPICAL 2 TIMES DAILY
COMMUNITY

## 2022-12-13 RX ORDER — HYDRALAZINE HYDROCHLORIDE 25 MG/1
25 TABLET, FILM COATED ORAL 3 TIMES DAILY
COMMUNITY

## 2022-12-13 RX ORDER — ASCORBIC ACID 500 MG
1000 TABLET ORAL DAILY
COMMUNITY

## 2022-12-13 RX ORDER — SODIUM CHLORIDE 0.9 % (FLUSH) 0.9 %
5-40 SYRINGE (ML) INJECTION PRN
Status: DISCONTINUED | OUTPATIENT
Start: 2022-12-13 | End: 2022-12-17 | Stop reason: HOSPADM

## 2022-12-13 RX ORDER — POTASSIUM CHLORIDE 20 MEQ/1
20 TABLET, EXTENDED RELEASE ORAL DAILY
COMMUNITY

## 2022-12-13 RX ORDER — CARVEDILOL 6.25 MG/1
6.25 TABLET ORAL 2 TIMES DAILY WITH MEALS
Status: DISCONTINUED | OUTPATIENT
Start: 2022-12-13 | End: 2022-12-17 | Stop reason: HOSPADM

## 2022-12-13 RX ORDER — SODIUM CHLORIDE 0.9 % (FLUSH) 0.9 %
5-40 SYRINGE (ML) INJECTION EVERY 12 HOURS SCHEDULED
Status: DISCONTINUED | OUTPATIENT
Start: 2022-12-13 | End: 2022-12-17 | Stop reason: HOSPADM

## 2022-12-13 RX ORDER — FUROSEMIDE 10 MG/ML
40 INJECTION INTRAMUSCULAR; INTRAVENOUS 2 TIMES DAILY
Status: DISCONTINUED | OUTPATIENT
Start: 2022-12-14 | End: 2022-12-16

## 2022-12-13 RX ORDER — ATORVASTATIN CALCIUM 40 MG/1
40 TABLET, FILM COATED ORAL
COMMUNITY

## 2022-12-13 RX ORDER — PANTOPRAZOLE SODIUM 40 MG/1
40 TABLET, DELAYED RELEASE ORAL
Status: DISCONTINUED | OUTPATIENT
Start: 2022-12-14 | End: 2022-12-17 | Stop reason: HOSPADM

## 2022-12-13 RX ORDER — HYDRALAZINE HYDROCHLORIDE 25 MG/1
25 TABLET, FILM COATED ORAL 3 TIMES DAILY
Status: DISCONTINUED | OUTPATIENT
Start: 2022-12-13 | End: 2022-12-17 | Stop reason: HOSPADM

## 2022-12-13 RX ORDER — HYDROXYZINE HYDROCHLORIDE 25 MG/1
25 TABLET, FILM COATED ORAL EVERY 8 HOURS PRN
Status: DISCONTINUED | OUTPATIENT
Start: 2022-12-13 | End: 2022-12-17 | Stop reason: HOSPADM

## 2022-12-13 RX ORDER — FUROSEMIDE 10 MG/ML
40 INJECTION INTRAMUSCULAR; INTRAVENOUS ONCE
Status: COMPLETED | OUTPATIENT
Start: 2022-12-13 | End: 2022-12-13

## 2022-12-13 RX ORDER — CARVEDILOL 6.25 MG/1
6.25 TABLET ORAL 2 TIMES DAILY WITH MEALS
COMMUNITY

## 2022-12-13 RX ORDER — SODIUM HYPOCHLORITE 1.25 MG/ML
1 SOLUTION TOPICAL DAILY
COMMUNITY

## 2022-12-13 RX ORDER — ATORVASTATIN CALCIUM 40 MG/1
40 TABLET, FILM COATED ORAL
Status: DISCONTINUED | OUTPATIENT
Start: 2022-12-13 | End: 2022-12-17 | Stop reason: HOSPADM

## 2022-12-13 RX ORDER — ACETAMINOPHEN 325 MG/1
650 TABLET ORAL EVERY 6 HOURS PRN
Status: DISCONTINUED | OUTPATIENT
Start: 2022-12-13 | End: 2022-12-17 | Stop reason: HOSPADM

## 2022-12-13 RX ORDER — CLOPIDOGREL BISULFATE 75 MG/1
75 TABLET ORAL DAILY
COMMUNITY

## 2022-12-13 RX ORDER — SODIUM CHLORIDE 9 MG/ML
INJECTION, SOLUTION INTRAVENOUS PRN
Status: DISCONTINUED | OUTPATIENT
Start: 2022-12-13 | End: 2022-12-17 | Stop reason: HOSPADM

## 2022-12-13 RX ORDER — PANTOPRAZOLE SODIUM 40 MG/1
40 TABLET, DELAYED RELEASE ORAL DAILY
COMMUNITY

## 2022-12-13 RX ORDER — CLOPIDOGREL BISULFATE 75 MG/1
75 TABLET ORAL DAILY
Status: DISCONTINUED | OUTPATIENT
Start: 2022-12-14 | End: 2022-12-17 | Stop reason: HOSPADM

## 2022-12-13 RX ORDER — NABUMETONE 500 MG/1
500 TABLET, FILM COATED ORAL DAILY PRN
COMMUNITY

## 2022-12-13 RX ORDER — ACETAMINOPHEN 650 MG/1
650 SUPPOSITORY RECTAL EVERY 6 HOURS PRN
Status: DISCONTINUED | OUTPATIENT
Start: 2022-12-13 | End: 2022-12-17 | Stop reason: HOSPADM

## 2022-12-13 RX ORDER — AZITHROMYCIN 250 MG/1
250 TABLET, FILM COATED ORAL DAILY
COMMUNITY
End: 2022-12-13

## 2022-12-13 RX ORDER — GUAIFENESIN 600 MG/1
1200 TABLET, EXTENDED RELEASE ORAL 2 TIMES DAILY
COMMUNITY

## 2022-12-13 RX ORDER — LEVOTHYROXINE SODIUM 88 UG/1
88 TABLET ORAL DAILY
Status: DISCONTINUED | OUTPATIENT
Start: 2022-12-14 | End: 2022-12-17 | Stop reason: HOSPADM

## 2022-12-13 RX ORDER — MULTIVIT-MIN/IRON/FOLIC ACID/K 18-600-40
1 CAPSULE ORAL DAILY
COMMUNITY

## 2022-12-13 RX ORDER — IPRATROPIUM BROMIDE AND ALBUTEROL SULFATE 2.5; .5 MG/3ML; MG/3ML
1 SOLUTION RESPIRATORY (INHALATION) 3 TIMES DAILY PRN
Status: DISCONTINUED | OUTPATIENT
Start: 2022-12-13 | End: 2022-12-17 | Stop reason: HOSPADM

## 2022-12-13 RX ORDER — TOPIRAMATE 25 MG/1
50 TABLET ORAL NIGHTLY
Status: DISCONTINUED | OUTPATIENT
Start: 2022-12-13 | End: 2022-12-17 | Stop reason: HOSPADM

## 2022-12-13 RX ORDER — IPRATROPIUM BROMIDE AND ALBUTEROL SULFATE 2.5; .5 MG/3ML; MG/3ML
1 SOLUTION RESPIRATORY (INHALATION) 3 TIMES DAILY PRN
COMMUNITY

## 2022-12-13 RX ORDER — TOPIRAMATE 25 MG/1
25 TABLET ORAL
Status: DISCONTINUED | OUTPATIENT
Start: 2022-12-14 | End: 2022-12-17 | Stop reason: HOSPADM

## 2022-12-13 RX ORDER — NIFEDIPINE 30 MG/1
30 TABLET, EXTENDED RELEASE ORAL DAILY
Status: DISCONTINUED | OUTPATIENT
Start: 2022-12-14 | End: 2022-12-17 | Stop reason: HOSPADM

## 2022-12-13 RX ORDER — TRAZODONE HYDROCHLORIDE 100 MG/1
100 TABLET ORAL NIGHTLY
Status: DISCONTINUED | OUTPATIENT
Start: 2022-12-13 | End: 2022-12-17 | Stop reason: HOSPADM

## 2022-12-13 RX ORDER — TORSEMIDE 20 MG/1
20 TABLET ORAL DAILY
COMMUNITY

## 2022-12-13 RX ORDER — NIFEDIPINE 30 MG/1
30 TABLET, FILM COATED, EXTENDED RELEASE ORAL DAILY
COMMUNITY

## 2022-12-13 RX ORDER — TRAZODONE HYDROCHLORIDE 100 MG/1
100 TABLET ORAL NIGHTLY
COMMUNITY

## 2022-12-13 RX ORDER — HYDROXYZINE HYDROCHLORIDE 25 MG/1
25 TABLET, FILM COATED ORAL EVERY 8 HOURS PRN
COMMUNITY

## 2022-12-13 RX ORDER — ZINC GLUCONATE 50 MG
50 TABLET ORAL DAILY
COMMUNITY

## 2022-12-13 RX ORDER — LEVOTHYROXINE SODIUM 88 UG/1
88 TABLET ORAL DAILY
COMMUNITY

## 2022-12-13 RX ORDER — ISOSORBIDE MONONITRATE 60 MG/1
60 TABLET, EXTENDED RELEASE ORAL DAILY
COMMUNITY

## 2022-12-13 RX ORDER — 0.9 % SODIUM CHLORIDE 0.9 %
30 INTRAVENOUS SOLUTION INTRAVENOUS ONCE
Status: COMPLETED | OUTPATIENT
Start: 2022-12-13 | End: 2022-12-14

## 2022-12-13 RX ORDER — ENOXAPARIN SODIUM 100 MG/ML
40 INJECTION SUBCUTANEOUS DAILY
Status: DISCONTINUED | OUTPATIENT
Start: 2022-12-14 | End: 2022-12-17 | Stop reason: HOSPADM

## 2022-12-13 RX ORDER — DEXAMETHASONE 6 MG/1
6 TABLET ORAL
COMMUNITY

## 2022-12-13 RX ORDER — HYDROCODONE BITARTRATE AND ACETAMINOPHEN 5; 325 MG/1; MG/1
1 TABLET ORAL ONCE
Status: COMPLETED | OUTPATIENT
Start: 2022-12-13 | End: 2022-12-13

## 2022-12-13 RX ORDER — TOPIRAMATE 25 MG/1
50 TABLET ORAL
COMMUNITY

## 2022-12-13 RX ADMIN — FUROSEMIDE 40 MG: 10 INJECTION, SOLUTION INTRAMUSCULAR; INTRAVENOUS at 21:42

## 2022-12-13 RX ADMIN — HYDROCODONE BITARTRATE AND ACETAMINOPHEN 1 TABLET: 5; 325 TABLET ORAL at 22:22

## 2022-12-13 RX ADMIN — SODIUM CHLORIDE 1641 ML: 9 INJECTION, SOLUTION INTRAVENOUS at 21:45

## 2022-12-13 ASSESSMENT — LIFESTYLE VARIABLES
HOW MANY STANDARD DRINKS CONTAINING ALCOHOL DO YOU HAVE ON A TYPICAL DAY: PATIENT DOES NOT DRINK
HOW OFTEN DO YOU HAVE A DRINK CONTAINING ALCOHOL: NEVER

## 2022-12-13 ASSESSMENT — ENCOUNTER SYMPTOMS
ABDOMINAL PAIN: 0
RHINORRHEA: 0
NAUSEA: 0
WHEEZING: 0
VOMITING: 0
SORE THROAT: 0
CHEST TIGHTNESS: 0
DIARRHEA: 0
SHORTNESS OF BREATH: 1
COUGH: 1
CONSTIPATION: 0

## 2022-12-13 ASSESSMENT — PAIN SCALES - GENERAL
PAINLEVEL_OUTOF10: 8
PAINLEVEL_OUTOF10: 10
PAINLEVEL_OUTOF10: 10

## 2022-12-13 ASSESSMENT — PAIN DESCRIPTION - LOCATION: LOCATION: GENERALIZED

## 2022-12-13 ASSESSMENT — PAIN - FUNCTIONAL ASSESSMENT
PAIN_FUNCTIONAL_ASSESSMENT: 0-10
PAIN_FUNCTIONAL_ASSESSMENT: PREVENTS OR INTERFERES WITH ALL ACTIVE AND SOME PASSIVE ACTIVITIES

## 2022-12-13 ASSESSMENT — PAIN DESCRIPTION - DESCRIPTORS: DESCRIPTORS: ACHING;DISCOMFORT

## 2022-12-13 ASSESSMENT — PAIN DESCRIPTION - PAIN TYPE: TYPE: CHRONIC PAIN

## 2022-12-13 ASSESSMENT — PAIN DESCRIPTION - FREQUENCY: FREQUENCY: CONTINUOUS

## 2022-12-13 ASSESSMENT — PAIN DESCRIPTION - ONSET: ONSET: ON-GOING

## 2022-12-14 LAB
A/G RATIO: 1 (ref 1.1–2.2)
ALBUMIN SERPL-MCNC: 2.3 G/DL (ref 3.4–5)
ALP BLD-CCNC: 115 U/L (ref 40–129)
ALT SERPL-CCNC: 110 U/L (ref 10–40)
ANION GAP SERPL CALCULATED.3IONS-SCNC: 15 MMOL/L (ref 3–16)
AST SERPL-CCNC: 59 U/L (ref 15–37)
BACTERIA: ABNORMAL /HPF
BASOPHILS ABSOLUTE: 0 K/UL (ref 0–0.2)
BASOPHILS RELATIVE PERCENT: 0 %
BILIRUB SERPL-MCNC: 0.5 MG/DL (ref 0–1)
BILIRUBIN URINE: NEGATIVE
BLOOD, URINE: ABNORMAL
BUDDING YEAST: PRESENT
BUN BLDV-MCNC: 42 MG/DL (ref 7–20)
C-REACTIVE PROTEIN: 226 MG/L (ref 0–5.1)
CALCIUM SERPL-MCNC: 8.9 MG/DL (ref 8.3–10.6)
CHLORIDE BLD-SCNC: 104 MMOL/L (ref 99–110)
CLARITY: ABNORMAL
CO2: 20 MMOL/L (ref 21–32)
COLOR: YELLOW
CREAT SERPL-MCNC: 0.8 MG/DL (ref 0.6–1.2)
EKG ATRIAL RATE: 84 BPM
EKG DIAGNOSIS: NORMAL
EKG P AXIS: 37 DEGREES
EKG P-R INTERVAL: 216 MS
EKG Q-T INTERVAL: 358 MS
EKG QRS DURATION: 86 MS
EKG QTC CALCULATION (BAZETT): 423 MS
EKG R AXIS: 0 DEGREES
EKG T AXIS: 153 DEGREES
EKG VENTRICULAR RATE: 84 BPM
EOSINOPHILS ABSOLUTE: 0 K/UL (ref 0–0.6)
EOSINOPHILS RELATIVE PERCENT: 0 %
EPITHELIAL CELLS, UA: 2 /HPF (ref 0–5)
GFR SERPL CREATININE-BSD FRML MDRD: >60 ML/MIN/{1.73_M2}
GLUCOSE BLD-MCNC: 120 MG/DL (ref 70–99)
GLUCOSE URINE: NEGATIVE MG/DL
HCT VFR BLD CALC: 30.4 % (ref 36–48)
HEMOGLOBIN: 9.6 G/DL (ref 12–16)
HYALINE CASTS: 8 /LPF (ref 0–8)
HYALINE CASTS: PRESENT
HYPHAL YEAST: PRESENT
KETONES, URINE: NEGATIVE MG/DL
LACTIC ACID, SEPSIS: 1.1 MMOL/L (ref 0.4–1.9)
LACTIC ACID, SEPSIS: 1.5 MMOL/L (ref 0.4–1.9)
LEUKOCYTE ESTERASE, URINE: ABNORMAL
LYMPHOCYTES ABSOLUTE: 1.5 K/UL (ref 1–5.1)
LYMPHOCYTES RELATIVE PERCENT: 9.7 %
MCH RBC QN AUTO: 27.6 PG (ref 26–34)
MCHC RBC AUTO-ENTMCNC: 31.6 G/DL (ref 31–36)
MCV RBC AUTO: 87.2 FL (ref 80–100)
MICROSCOPIC EXAMINATION: YES
MONOCYTES ABSOLUTE: 0.6 K/UL (ref 0–1.3)
MONOCYTES RELATIVE PERCENT: 4 %
NEUTROPHILS ABSOLUTE: 13.7 K/UL (ref 1.7–7.7)
NEUTROPHILS RELATIVE PERCENT: 86.3 %
NITRITE, URINE: NEGATIVE
PDW BLD-RTO: 16.9 % (ref 12.4–15.4)
PH UA: 5.5 (ref 5–8)
PLATELET # BLD: 151 K/UL (ref 135–450)
PMV BLD AUTO: 7.6 FL (ref 5–10.5)
POTASSIUM REFLEX MAGNESIUM: 4 MMOL/L (ref 3.5–5.1)
PROCALCITONIN: 5.08 NG/ML (ref 0–0.15)
PROTEIN UA: 30 MG/DL
RBC # BLD: 3.49 M/UL (ref 4–5.2)
RBC UA: 50 /HPF (ref 0–4)
SODIUM BLD-SCNC: 139 MMOL/L (ref 136–145)
SPECIFIC GRAVITY UA: 1.01 (ref 1–1.03)
TOTAL PROTEIN: 4.5 G/DL (ref 6.4–8.2)
URINE REFLEX TO CULTURE: YES
URINE TYPE: ABNORMAL
UROBILINOGEN, URINE: 0.2 E.U./DL
WBC # BLD: 15.9 K/UL (ref 4–11)
WBC UA: 873 /HPF (ref 0–5)
YEAST HYPHAE, UR: PRESENT

## 2022-12-14 PROCEDURE — 6370000000 HC RX 637 (ALT 250 FOR IP): Performed by: INTERNAL MEDICINE

## 2022-12-14 PROCEDURE — 85025 COMPLETE CBC W/AUTO DIFF WBC: CPT

## 2022-12-14 PROCEDURE — 97166 OT EVAL MOD COMPLEX 45 MIN: CPT

## 2022-12-14 PROCEDURE — 2580000003 HC RX 258: Performed by: NURSE PRACTITIONER

## 2022-12-14 PROCEDURE — 97161 PT EVAL LOW COMPLEX 20 MIN: CPT

## 2022-12-14 PROCEDURE — 84145 PROCALCITONIN (PCT): CPT

## 2022-12-14 PROCEDURE — 94760 N-INVAS EAR/PLS OXIMETRY 1: CPT

## 2022-12-14 PROCEDURE — 87040 BLOOD CULTURE FOR BACTERIA: CPT

## 2022-12-14 PROCEDURE — 81001 URINALYSIS AUTO W/SCOPE: CPT

## 2022-12-14 PROCEDURE — 36415 COLL VENOUS BLD VENIPUNCTURE: CPT

## 2022-12-14 PROCEDURE — 6370000000 HC RX 637 (ALT 250 FOR IP): Performed by: NURSE PRACTITIONER

## 2022-12-14 PROCEDURE — 87086 URINE CULTURE/COLONY COUNT: CPT

## 2022-12-14 PROCEDURE — 83605 ASSAY OF LACTIC ACID: CPT

## 2022-12-14 PROCEDURE — 92610 EVALUATE SWALLOWING FUNCTION: CPT

## 2022-12-14 PROCEDURE — 93010 ELECTROCARDIOGRAM REPORT: CPT | Performed by: INTERNAL MEDICINE

## 2022-12-14 PROCEDURE — 1200000000 HC SEMI PRIVATE

## 2022-12-14 PROCEDURE — 80053 COMPREHEN METABOLIC PANEL: CPT

## 2022-12-14 PROCEDURE — 6360000002 HC RX W HCPCS: Performed by: NURSE PRACTITIONER

## 2022-12-14 PROCEDURE — 97535 SELF CARE MNGMENT TRAINING: CPT

## 2022-12-14 PROCEDURE — 86140 C-REACTIVE PROTEIN: CPT

## 2022-12-14 PROCEDURE — 97530 THERAPEUTIC ACTIVITIES: CPT

## 2022-12-14 PROCEDURE — 87449 NOS EACH ORGANISM AG IA: CPT

## 2022-12-14 RX ORDER — DICYCLOMINE HYDROCHLORIDE 10 MG/1
10 CAPSULE ORAL 3 TIMES DAILY PRN
Status: DISCONTINUED | OUTPATIENT
Start: 2022-12-14 | End: 2022-12-17 | Stop reason: HOSPADM

## 2022-12-14 RX ADMIN — NIFEDIPINE 30 MG: 30 TABLET, EXTENDED RELEASE ORAL at 08:15

## 2022-12-14 RX ADMIN — SODIUM CHLORIDE: 9 INJECTION, SOLUTION INTRAVENOUS at 00:56

## 2022-12-14 RX ADMIN — TRAZODONE HYDROCHLORIDE 100 MG: 100 TABLET ORAL at 00:54

## 2022-12-14 RX ADMIN — ATORVASTATIN CALCIUM 40 MG: 40 TABLET, FILM COATED ORAL at 00:53

## 2022-12-14 RX ADMIN — HYDRALAZINE HYDROCHLORIDE 25 MG: 25 TABLET, FILM COATED ORAL at 22:29

## 2022-12-14 RX ADMIN — SODIUM CHLORIDE: 9 INJECTION, SOLUTION INTRAVENOUS at 22:52

## 2022-12-14 RX ADMIN — SODIUM CHLORIDE, PRESERVATIVE FREE 10 ML: 5 INJECTION INTRAVENOUS at 04:29

## 2022-12-14 RX ADMIN — ACETAMINOPHEN 650 MG: 325 TABLET, FILM COATED ORAL at 00:54

## 2022-12-14 RX ADMIN — LEVOTHYROXINE SODIUM 88 MCG: 0.09 TABLET ORAL at 05:33

## 2022-12-14 RX ADMIN — HYDRALAZINE HYDROCHLORIDE 25 MG: 25 TABLET, FILM COATED ORAL at 08:15

## 2022-12-14 RX ADMIN — CLOPIDOGREL BISULFATE 75 MG: 75 TABLET ORAL at 08:15

## 2022-12-14 RX ADMIN — TOPIRAMATE 25 MG: 25 TABLET, FILM COATED ORAL at 05:33

## 2022-12-14 RX ADMIN — CARVEDILOL 6.25 MG: 6.25 TABLET, FILM COATED ORAL at 08:15

## 2022-12-14 RX ADMIN — ENOXAPARIN SODIUM 40 MG: 100 INJECTION SUBCUTANEOUS at 08:15

## 2022-12-14 RX ADMIN — VANCOMYCIN HYDROCHLORIDE 1250 MG: 1.25 INJECTION, POWDER, LYOPHILIZED, FOR SOLUTION INTRAVENOUS at 01:02

## 2022-12-14 RX ADMIN — CARVEDILOL 6.25 MG: 6.25 TABLET, FILM COATED ORAL at 00:53

## 2022-12-14 RX ADMIN — TOPIRAMATE 50 MG: 25 TABLET, FILM COATED ORAL at 22:38

## 2022-12-14 RX ADMIN — SODIUM CHLORIDE, PRESERVATIVE FREE 10 ML: 5 INJECTION INTRAVENOUS at 08:25

## 2022-12-14 RX ADMIN — FUROSEMIDE 40 MG: 10 INJECTION, SOLUTION INTRAMUSCULAR; INTRAVENOUS at 17:33

## 2022-12-14 RX ADMIN — TOPIRAMATE 50 MG: 25 TABLET, FILM COATED ORAL at 00:54

## 2022-12-14 RX ADMIN — HYDRALAZINE HYDROCHLORIDE 25 MG: 25 TABLET, FILM COATED ORAL at 14:46

## 2022-12-14 RX ADMIN — CEFEPIME 2000 MG: 2 INJECTION, POWDER, FOR SOLUTION INTRAVENOUS at 08:21

## 2022-12-14 RX ADMIN — TRAZODONE HYDROCHLORIDE 100 MG: 100 TABLET ORAL at 22:29

## 2022-12-14 RX ADMIN — FUROSEMIDE 40 MG: 10 INJECTION, SOLUTION INTRAMUSCULAR; INTRAVENOUS at 08:15

## 2022-12-14 RX ADMIN — AZITHROMYCIN DIHYDRATE 500 MG: 500 INJECTION, POWDER, LYOPHILIZED, FOR SOLUTION INTRAVENOUS at 04:28

## 2022-12-14 RX ADMIN — ATORVASTATIN CALCIUM 40 MG: 40 TABLET, FILM COATED ORAL at 22:29

## 2022-12-14 RX ADMIN — SODIUM CHLORIDE, PRESERVATIVE FREE 10 ML: 5 INJECTION INTRAVENOUS at 22:57

## 2022-12-14 RX ADMIN — PANTOPRAZOLE SODIUM 40 MG: 40 TABLET, DELAYED RELEASE ORAL at 05:35

## 2022-12-14 RX ADMIN — CARVEDILOL 6.25 MG: 6.25 TABLET, FILM COATED ORAL at 17:33

## 2022-12-14 RX ADMIN — HYDRALAZINE HYDROCHLORIDE 25 MG: 25 TABLET, FILM COATED ORAL at 00:54

## 2022-12-14 RX ADMIN — DICYCLOMINE HYDROCHLORIDE 10 MG: 10 CAPSULE ORAL at 22:29

## 2022-12-14 RX ADMIN — CEFEPIME 2000 MG: 2 INJECTION, POWDER, FOR SOLUTION INTRAVENOUS at 03:16

## 2022-12-14 RX ADMIN — CEFEPIME 2000 MG: 2 INJECTION, POWDER, FOR SOLUTION INTRAVENOUS at 22:57

## 2022-12-14 RX ADMIN — DICYCLOMINE HYDROCHLORIDE 10 MG: 10 CAPSULE ORAL at 14:46

## 2022-12-14 ASSESSMENT — PAIN SCALES - GENERAL
PAINLEVEL_OUTOF10: 10
PAINLEVEL_OUTOF10: 6
PAINLEVEL_OUTOF10: 10
PAINLEVEL_OUTOF10: 0

## 2022-12-14 ASSESSMENT — PAIN DESCRIPTION - LOCATION
LOCATION: GENERALIZED
LOCATION: ABDOMEN
LOCATION: ABDOMEN

## 2022-12-14 ASSESSMENT — PAIN DESCRIPTION - DESCRIPTORS: DESCRIPTORS: ACHING

## 2022-12-14 NOTE — ED NOTES
Report given to [facility]. SBAR discussed. All questions answered. RN verbalized understanding.       Mike Wiseman RN  12/13/22 2066

## 2022-12-14 NOTE — PROGRESS NOTES
Hospitalist Progress Note      PCP: Ramiro Kuo    Date of Admission: 12/13/2022    Subjective: c/o left shoulder pain and itching, still with SOB, productive cough    Medications:  Reviewed    Infusion Medications    sodium chloride 10 mL/hr at 12/14/22 0056     Scheduled Medications    [START ON 12/15/2022] vancomycin  1,250 mg IntraVENous Q24H    vancomycin (VANCOCIN) intermittent dosing (placeholder)   Other RX Placeholder    azithromycin  500 mg IntraVENous Q24H    cefepime  2,000 mg IntraVENous Q12H    atorvastatin  40 mg Oral QHS    carvedilol  6.25 mg Oral BID WC    clopidogrel  75 mg Oral Daily    hydrALAZINE  25 mg Oral TID    levothyroxine  88 mcg Oral Daily    NIFEdipine  30 mg Oral Daily    pantoprazole  40 mg Oral QAM AC    topiramate  50 mg Oral Nightly    topiramate  25 mg Oral QAM AC    traZODone  100 mg Oral Nightly    furosemide  40 mg IntraVENous BID    sodium chloride flush  5-40 mL IntraVENous 2 times per day    enoxaparin  40 mg SubCUTAneous Daily     PRN Meds: dicyclomine, hydrOXYzine HCl, ipratropium-albuterol, sodium chloride flush, sodium chloride, acetaminophen **OR** acetaminophen    No intake or output data in the 24 hours ending 12/14/22 1625    Physical Exam Performed:    BP (!) 125/54   Pulse (!) 113   Temp 97 °F (36.1 °C) (Axillary)   Resp 20   Ht 5' 4\" (1.626 m)   Wt 154 lb 8.7 oz (70.1 kg)   SpO2 93%   BMI 26.53 kg/m²     General appearance: NAD  Lungs: Respirations are easy regular nonlabored, coarse Rales throughout, congested wet nonproductive cough. .  Heart: Regular rate and rhythm with Normal S1/S2 without murmurs, rubs or gallops, point of maximum impulse non-displaced  Abdomen: Soft, non-tender or non-distended without rigidity or guarding and positive bowel sounds all four quadrants.   Extremities: Poor muscle tone lower extremities, foot drop  Skin: Pale, multiple areas of ecchymosis and bruising to the lower extremities, advanced decubitus sacral ulcer.  Neurologic: Alert and oriented X 3, no focal motor or sensory deficit  Mental status: Alert, oriented, thought content appropriate. Capillary Refill: Acceptable  < 3 seconds  Peripheral Pulses: +3 Easily felt, not easily obliterated with pressure    Labs:   Recent Labs     12/13/22 1936 12/14/22  0718   WBC 18.9* 15.9*   HGB 10.5* 9.6*   HCT 33.2* 30.4*    151     Recent Labs     12/13/22 1936 12/14/22  0718    139   K 4.2 4.0    104   CO2 22 20*   BUN 47* 42*   CREATININE 0.9 0.8   CALCIUM 8.8 8.9     Recent Labs     12/13/22 1936 12/14/22  0718   * 59*   * 110*   BILITOT 0.5 0.5   ALKPHOS 141* 115     No results for input(s): INR in the last 72 hours. No results for input(s): [place] in the last 72 hours. Urinalysis:      Lab Results   Component Value Date/Time    NITRU Negative 12/14/2022 10:11 AM    WBCUA 873 12/14/2022 10:11 AM    BACTERIA 4+ 12/14/2022 10:11 AM    RBCUA 50 12/14/2022 10:11 AM    BLOODU MODERATE 12/14/2022 10:11 AM    SPECGRAV 1.013 12/14/2022 10:11 AM    GLUCOSEU Negative 12/14/2022 10:11 AM       Radiology:  CT CHEST WO CONTRAST   Final Result   Consolidative airspace opacities in the right lung, along with tree-in-bud   nodular opacities, compatible with multilobar infection. RECOMMENDATIONS:   Follow-up radiographs recommended to ensure resolution.              IP CONSULT TO HOSPITALIST  IP CONSULT TO PHARMACY  IP CONSULT TO UROLOGY  IP CONSULT TO SOCIAL WORK    Assessment/Plan:    Active Hospital Problems    Diagnosis     Sepsis ([facility] [address] [A41.9]      Priority: Medium    HAP (hospital-acquired pneumonia) [J18.9, Y95]      Priority: Medium    Decubital ulcer [L89.90]      Priority: Medium         HAP: Evidenced on CT right upper lobe, right lower lobe  Cefepime, vancomycin and azithromycin  MRSA nasal, Legionella, strep pneumoniae  Sputum culture  COVID and influenza negative  Oxygen therapy to maintain saturation greater than 90%, patient currently on room air saturating 92 to 94%     Sepsis: Questionable source pneumonia, decubitus, CAUTI  Broad-spectrum antibiotic: as discussed above  WBC 18.9, lactic acid 3.8-> 1.5->1.0  Afebrile, no tachycardia, no hypotension  CRP: 226  UA to be collected after simpson changed  BC x2 in process     Chronic heart failure: BNP 38,570, adventitious breath sounds  Lasix 40 mg IVP twice daily  Daily weight  Strict intake and output     Decubitus ulcer: Chronic, wound care consulted        CAD HTN, HLD: Continue statin, carvedilol, hydralazine, nifedipine        DVT Prophylaxis: Lovenox  Diet: ADULT ORAL NUTRITION SUPPLEMENT; Breakfast, Dinner; Standard High Calorie/High Protein Oral Supplement  ADULT ORAL NUTRITION SUPPLEMENT; Lunch; Wound Healing Oral Supplement  ADULT DIET; Easy to Chew; 4 carb choices (60 gm/meal);  Low Fat/Low Chol/High Fiber/BOGDAN  Code Status: Full Code  PT/OT Eval Status: ordered    Alex Gutierrez MD

## 2022-12-14 NOTE — H&P
Hospital Medicine History & Physical      PCP: Yaritza Brown    Date of Admission: 12/13/2022    Date of Service: Pt seen/examined on 12/13/2022 and Admitted to Inpatient     Chief Complaint:  sent ffrom ECF for ? Pna, and sepsis      History Of Present Illness: The patient is a 76 y.o. female who presents to [facility] with PMHx: Anxiety, CKD stage IV, MDD, CAD, HF, hypothyroid, GERD, PVD, HLD, GERD, hiatal hernia, MI, vertigo, right renal stent, indwelling Gurrola catheter    Lives at [facility] currently  [address] full  H POA daughter  Anticoagulation therapy: None      Patient was sent in from the [facility] with concerns of an abnormal chest x-ray and leukocytosis of 21. Patient had just recently finished antibiotic therapy for sepsis secondary to an advanced decubitus ulcer  Patient also has developed a very wet sounding cough in the last 2 to 3 days      ED workup: BNP: 83723, CT chest concerning for opacities in the right upper lobe and right lower lobe. A T11 endplate compression deformity noted. Renal function BUN 47, creatinine 0.9 GFR greater than 60. COVID-negative. VBG 7.4. WBC 18.9. H&H is very stable 10/33. Lactic acid elevated at 3.8. EKG sinus rhythm first-degree AV block. On my exam: Patient is pleasant, awake and alert. She does have coarse wet breath sounds and she does have a very rattling wet cough. Abdomen is soft nontender. She has tenderness on the right heel without any evidence of open wound. She does appear chronically ill and quite debilitated but not toxic. Patient's granddaughter Claudia Velazquez is at bedside.       Past Medical History:        Diagnosis Date    Acid reflux     Anxiety     Depression     Hiatal hernia     Hyperlipidemia     Hypertension     MI (myocardial infarction) ([facility] [address] 2000, 2003    Thyroid disease Vertigo        Past Surgical History:        Procedure Laterality Date    ANGIOPLASTY      CARDIAC SURGERY      bypass and stent    HYSTERECTOMY (CERVIX STATUS UNKNOWN)         Medications Prior to Admission:    Prior to Admission medications    Medication Sig Start Date End Date Taking? Authorizing Provider   sodium hypochlorite (DAKINS) 0.125 % SOLN external solution Apply 1 each topically daily Apply to sacrum Q day   Yes Historical Provider, MD   guaiFENesin (MUCINEX) 600 MG extended release tablet Take 1,200 mg by mouth 2 times daily   Yes Historical Provider, MD   ipratropium-albuterol (DUONEB) 0.5-2.5 (3) MG/3ML SOLN nebulizer solution Inhale 1 vial into the lungs 3 times daily as needed for Shortness of Breath   Yes Historical Provider, MD   hydrOXYzine HCl (ATARAX) 25 MG tablet Take 25 mg by mouth every 8 hours as needed for Itching   Yes Historical Provider, MD   Balsam [place]-Castor Oil (VENELEX) OINT ointment Apply 1 application topically 2 times daily Apply to buttocks   Yes Historical Provider, MD   carvedilol (COREG) 6.25 MG tablet Take 6.25 mg by mouth 2 times daily (with meals)   Yes Historical Provider, MD   topiramate (TOPAMAX) 25 MG tablet Take 50 mg by mouth nightly TDD 75 mg   Yes Historical Provider, MD   traZODone (DESYREL) 100 MG tablet Take 100 mg by mouth nightly   Yes Historical Provider, MD   levothyroxine (SYNTHROID) 88 MCG tablet Take 88 mcg by mouth Daily   Yes Historical Provider, MD   HYDROcodone-acetaminophen (NORCO) 5-325 MG per tablet Take 1 tablet by mouth every 6 hours as needed for Pain.    Yes Historical Provider, MD   pantoprazole (PROTONIX) 40 MG tablet Take 40 mg by mouth daily   Yes Historical Provider, MD   dexamethasone (DECADRON) 6 MG tablet Take 6 mg by mouth daily (with breakfast)   Yes Historical Provider, MD   clopidogrel (PLAVIX) 75 MG tablet Take 75 mg by mouth daily   Yes Historical Provider, MD   NIFEdipine (ADALAT CC) 30 MG extended release tablet Take 30 mg by mouth daily   Yes Historical Provider, MD   torsemide (DEMADEX) 20 MG tablet Take 20 mg by mouth daily   Yes Historical Provider, MD   potassium chloride (KLOR-CON M) 20 MEQ extended release tablet Take 20 mEq by mouth daily   Yes Historical Provider, MD   isosorbide mononitrate (IMDUR) 60 MG extended release tablet Take 60 mg by mouth daily   Yes Historical Provider, MD   zinc gluconate 50 MG tablet Take 50 mg by mouth daily   Yes Historical Provider, MD   atorvastatin (LIPITOR) 40 MG tablet Take 40 mg by mouth nightly   Yes Historical Provider, MD   vitamin C (ASCORBIC ACID) 500 MG tablet Take 1,000 mg by mouth daily   Yes Historical Provider, MD   Cholecalciferol (VITAMIN D) 50 MCG (2000 UT) CAPS capsule Take 1 capsule by mouth daily   Yes Historical Provider, MD   nabumetone (RELAFEN) 500 MG tablet Take 500 mg by mouth daily as needed for Pain   Yes Historical Provider, MD   hydrALAZINE (APRESOLINE) 25 MG tablet Take 25 mg by mouth 3 times daily   Yes Historical Provider, MD   topiramate (TOPAMAX) 25 MG tablet Take 25 mg by mouth every morning (before breakfast) TDD 75 mg    Historical Provider, MD       Allergies:  Demerol hcl [meperidine] and Morphine    Social History:  The patient currently lives [facility]    TOBACCO:   reports that she has never smoked. She has never used smokeless tobacco.  ETOH:   reports that she does not currently use alcohol. Family History:  Reviewed in detail and negative for DM, Early CAD, Cancer, CVA. Positive as follows:    History reviewed. No pertinent family history. REVIEW OF SYSTEMS:    as noted in the HPI. All other systems reviewed and negative. PHYSICAL EXAM:    BP (!) 160/72   Pulse 74   Temp 98 °F (36.7 °C) (Oral)   Resp 20   Ht 5' 4\" (1.626 m)   Wt 154 lb 5.2 oz (70 kg)   SpO2 92%   BMI 26.49 kg/m²     General appearance: No apparent distress appears stated age and cooperative. HEENT Normal cephalic, atraumatic without obvious deformity.   Pupils equal, LEUKOCYTESUR SMALL 07/03/2022 06:15 PM    BLOODU Negative 07/03/2022 06:15 PM    GLUCOSEU Negative 07/03/2022 06:15 PM       ABG  No results found for: RKC8BNN, BEART, L9PSOPVI, PHART, THGBART, FBE3CYO, PO2ART, IXM5MEN        Active Hospital Problems    Diagnosis Date Noted    Sepsis ([facility] [address] [A41.9] 12/13/2022     Priority: Medium    HAP (hospital-acquired pneumonia) [J18.9, N10] 12/13/2022     Priority: Medium    Decubital ulcer [L89.90] 12/13/2022     Priority: Medium         PHYSICIANS CERTIFICATION:    I certify that Dom Gregorio is expected to be hospitalized for more than 2 midnights based on the following assessment and plan:      ASSESSMENT/PLAN:    HAP: Evidenced on CT right upper lobe, right lower lobe  Cefepime, vancomycin and azithromycin  MRSA nasal, Legionella, strep pneumoniae  Sputum culture  COVID and influenza negative  Oxygen therapy to maintain saturation greater than 90%, patient currently on room air saturating 92 to 94%    Sepsis: Questionable source pneumonia, decubitus, CAUTI  Broad-spectrum antibiotic: as discussed above  WBC 18.9, lactic acid 3.8-> 1.5->1.0  Afebrile, no tachycardia, no hypotension  CRP: 226  UA to be collected after simpson changed  BC x2 in process    Chronic heart failure: BNP 41180, adventitious breath sounds  Lasix 40 mg IVP twice daily  Daily weight  Strict intake and output    Decubitus ulcer: Chronic, wound care consulted      CAD HTN, HLD: Continue statin, carvedilol, hydralazine, nifedipine      DVT Prophylaxis: Lovenox  Diet: ADULT DIET; Easy to Chew; 4 carb choices (60 gm/meal); Low Fat/Low Chol/High Fiber/BOGDAN; Low Sodium (2 gm)  Code Status: Full Code  PT/OT Eval Status: Consulted, nonambulatory    Dispo -admit, inpatient       WINDY Ross - CNP    Thank you Gagan Izquierdo for the opportunity to be involved in this patient's care. If you have any questions or concerns please feel free to contact me at [phone].     This dictation was performed with a verbal recognition program (DRAGON) and it was checked for errors. It is possible that there are still dictated errors within this office note. If so, please bring any errors to my attention for an addendum. All efforts were made to ensure that this office note is accurate.

## 2022-12-14 NOTE — CARE COORDINATION
Patient came from [place] prior to arrival.  Call to Brianna Fagan, [phone], at [place] who confirmed the patient is:  [x] [address], no Precert required for return. [x] Is fully vaccinated for Covid. [x] No Covid Test needed for return. [x] Confirmed with the patient that the plan is to return to this facility at D/C. JAMEL spoke with pt to discuss d/c plan. Pt is agreeable to return to [place]. JAMEL spoke with Brianna Fagan at [facility] who confirmed that pts bed is held.    RAFY Rahman  [phone]  Electronically signed by Beth Garcia on 12/14/2022 at 4:10 PM

## 2022-12-14 NOTE — ACP (ADVANCE CARE PLANNING)
Advance Care Planning     Advance Care Planning Activator (Inpatient)  Conversation Note      Date of ACP Conversation: 12/14/2022     Conversation Conducted with: Patient with Decision Making Capacity    ACP Activator[facility] Decision Maker:     Current Designated Health Care Decision Maker:     Primary Decision Maker: Cate  - Child - [phone]    Secondary Decision Maker: Tamir Munguia - [phone]    Care Preferences    Ventilation: \"If you were in your present state of health and suddenly became very ill and were unable to breathe on your own, what would your preference be about the use of a ventilator (breathing machine) if it were available to you? \"      Would the patient desire the use of ventilator (breathing machine)?: yes    \"If your health worsens and it becomes clear that your chance of recovery is unlikely, what would your preference be about the use of a ventilator (breathing machine) if it were available to you? \"     Would the patient desire the use of ventilator (breathing machine)?: No      Resuscitation  \"CPR works best to restart the heart when there is a sudden event, like a heart attack, in someone who is otherwise healthy. Unfortunately, CPR does not typically restart the heart for people who have serious health conditions or who are very sick. \"    \"In the event your heart stopped as a result of an underlying serious health condition, would you want attempts to be made to restart your heart (answer \"yes\" for attempt to resuscitate) or would you prefer a natural death (answer \"no\" for do not attempt to resuscitate)? \" yes       [] Yes   [x] No   Educated Patient / Rosaura Alpers regarding differences between Advance Directives and portable DNR orders.     Length of ACP Conversation in minutes:  10    Conversation Outcomes:  [x] ACP discussion completed  [] Existing advance directive reviewed with patient; no changes to patient's previously recorded wishes  [] New Advance Directive completed  [] Portable Do Not Rescitate prepared for Provider review and signature  [] POLST/POST/MOLST/MOST prepared for Provider review and signature      Follow-up plan:    [] Schedule follow-up conversation to continue planning  [] Referred individual to Provider for additional questions/concerns   [] Advised patient/agent/surrogate to review completed ACP document and update if needed with changes in condition, patient preferences or care setting    [x] This note routed to one or more involved healthcare providers    Electronically signed by Maya Heart on 12/14/2022 at 4:12 PM

## 2022-12-14 NOTE — PROGRESS NOTES
discharge. Prognosis: Poor;Guarded  Decision Making: Medium Complexity  History: see above  REQUIRES OT FOLLOW-UP: No  Activity Tolerance  Activity Tolerance: Patient limited by pain; Patient limited by fatigue  Activity Tolerance Comments: dizziness, SOB, pain, decreased motivation        Plan   Occupational Therapy Plan  Times Per Week: d/c acute OT     Restrictions  Restrictions/Precautions  Restrictions/Precautions: Fall Risk, Bed Alarm  Position Activity Restriction  Other position/activity restrictions: stage 3-4 sacral wound; simpson, IV    Subjective   General  Chart Reviewed: Yes  Additional Pertinent Hx: Per WINDY Pizano CNP's H&P: \"The patient is a 76 y.o. female who [facility] with PMHx: Anxiety, CKD stage IV, MDD, CAD, HF, hypothyroid, GERD, PVD, HLD, GERD, hiatal hernia, MI, vertigo, right renal stent, indwelling Simpson catheter     Lives at [facility] currently. .. Patient was sent in from the [facility] with concerns of an abnormal chest x-ray and leukocytosis of 21. Patient had just recently finished antibiotic therapy for sepsis secondary to an advanced decubitus ulcer  Patient also has developed a very wet sounding cough in the last 2 to 3 days        ED workup: BNP: 48073, CT chest concerning for opacities in the right upper lobe and right lower lobe. A T11 endplate compression deformity noted. Renal function BUN 47, creatinine 0.9 GFR greater than 60. COVID-negative. VBG 7.4. WBC 18.9. H&H is very stable 10/33. Lactic acid elevated at 3.8. EKG sinus rhythm first-degree AV block. \"  Family / Caregiver Present: Yes (friend)  Referring Practitioner: WINDY Pizano CNP  Diagnosis: HCAP, sepsis, Chronic heart failure, Decubitus ulcer  Subjective  Subjective: Pt met b/s for OT eval/tx with PT. Pt in bed on arrival, agreeable to participate in therapy, reports she needs to be turned/respositioned. Pt reports 10/10 sacral wound.      Social/Functional History  Social/Functional History  Type of Home: Facility [facility], [address])  Has the patient had two or more falls in the past year or any fall with injury in the past year?: No  Receives Help From: Other (comment) (quit therapy ~2 months ago)  ADL Assistance: Needs assistance (total care; questionable if the pt gets a shower or a bedbath; can feed self)  Ambulation Assistance: Non-ambulatory (mostly stays in the bed; when in the w/c she could propel self)  Transfer Assistance: Needs assistance (asissts of 2 for a transfer)  Active : No       Objective     Safety Devices  Type of Devices: Bed alarm in place;Call light within reach;Nurse notified; Left in bed;Heels elevated for pressure relief    Balance  Sitting:  (NANA, pt refused to attempt sitting EOB and reports she doesn't sit EOB at [facility])  Standing:  (pt non-ambulatory /bedbound at baseline)    AROM: Generally decreased, functional (significant limitations AROM clarissa shoulders (questionable effort)/PROM WFL, AROM WFL elbow and hand)  Strength: Generally decreased, functional (clarissa shoulder flexion 2/5 or less, elbow and grasp WFL)  Sensation: Intact (pt denies N/T in clarissa hands)    ADL  Feeding: Setup; Beverage management  Toileting: Dependent/Total  Toileting Skilled Clinical Factors: catheter, pt incontinent of BM. Total A for hygiene/changing lonnie pad rolling in bed with assist x2  Additional Comments: Anticipate pt is min A feeding, mod A grooming, total A bathing and dressing based on ROM/strength, bed mobility, fxl activity tolerance. Activity Tolerance  Activity Tolerance: Patient limited by pain; Other (comment)  Activity Tolerance Comments: the pt appears anxious    Bed mobility  Rolling to Left: Maximum assistance;2 Person assistance  Rolling to Right: Maximum assistance;2 Person assistance  Scooting: Dependent/Total;2 Person assistance    Transfers  Transfer Comments: pt refusing OOB, would have required use of maximove lift    Vision  Vision: Impaired  Vision Exceptions: Wears glasses for reading (doesn't wear glasses anymore)  Hearing  Hearing: Within functional limits    Cognition  Overall Cognitive Status: Exceptions  Arousal/Alertness: Appropriate responses to stimuli  Following Commands: Follows one step commands with increased time; Follows one step commands with repetition  Attention Span: Attends with cues to redirect  Memory: Decreased short term memory;Decreased recall of recent events  Initiation: Requires cues for some  Orientation  Overall Orientation Status: Impaired  Orientation Level: Oriented to time;Oriented to place; Disoriented to situation;Oriented to person (not completely oriented to situation)    Education Given To: Patient  Education Provided: Role of Therapy  Barriers to Learning: Cognition                          AM-PAC Score        AM-PAC Inpatient Daily Activity Raw Score: 9 (12/14/22 1507)  AM-PAC Inpatient ADL T-Scale Score : 25.33 (12/14/22 1507)  ADL Inpatient CMS 0-100% Score: 79.59 (12/14/22 1507)  ADL Inpatient CMS G-Code Modifier : CL (12/14/22 1507)           Goals  Short Term Goals  Time Frame for Short Term Goals: no acute OT goals identified.        Therapy Time   Individual Concurrent Group Co-treatment   Time In 9873         Time Out 1506         Minutes 25         Timed Code Treatment Minutes: [address], OTR/L 5128

## 2022-12-14 NOTE — PROGRESS NOTES
Rn assessed patients skin. Patient has a stage 4 sacral wound, she is unaware of time length that wound has been there. Wound care consulted. Vj Colón RN added orders for every other day dressing changes, or as needed for incontinence purposes. Patient has verbalized Becky Collier" that goes on in facility. States Nathaniel Martell are always so rough with me, but I dont want to name names because I dont want to get anyone in trouble\". Family does not want patient to return to current living facility. Social Work/Case Management aware.

## 2022-12-14 NOTE — ED PROVIDER NOTES
[facility]  eMERGENCY dEPARTMENTeNCOUnter      Pt Name: Liz Toledo  MRN: [id number]  [place] 1945  Date ofevaluation: 12/13/2022  Provider: Airam Luna MD    CHIEF COMPLAINT       Chief Complaint   Patient presents with    Blood Infection    Sore     Pt is going to be treated for sepsis in a couple of weeks; pt c/c for department visit is sacral sores; pt has been complaining as well of some difficultly breathing; pt is not showing acute signs of distress in department. Pt does not normally wear oxygen at home       HISTORY OF PRESENT ILLNESS   (Location/Symptom, Timing/Onset,Context/Setting, Quality, Duration, Modifying Factors, Severity)  Note limiting factors. Liz Toledo is a 76 y.o. female who  has a past medical history of Acid reflux, Anxiety, Depression, Hiatal hernia, Hyperlipidemia, Hypertension, MI (myocardial infarction) ([address] Thyroid disease, and Vertigo. presents to the emergency department from nursing home with EMS for evaluation of shortness of breath, elevated white blood cell count on routine labs and concern for possible pneumonia. Per the information sent with the patient, she does have very airspace disease on her outpatient x-ray. Also has elevated white count at 21. Patient states she has been coughing occasionally has yellow sputum production with her cough. Denies any chest pain. States she does feel short of breath occasionally. No home oxygen use at baseline. Patient denies any abdominal pain. No nausea or vomiting. Chief complaint states sore but patient has been treated for a sacral bedsore which shows no evidence of infection per charting from nursing home. Patient has no complaints about her sacral sore. Tolerating oral intake without difficulty. Normal urinary and bowel habits. The history is provided by the patient, the EMS personnel, the nursing home and medical records. NursingNotes were reviewed.     Pt was seen during the [place] 19 pandemic. Appropriate PPE worn by ME during patient encounters. Patient was cared for during a time with constrained hospital bed capacity with nationwide stress on resources and staffing. REVIEW OF SYSTEMS    (2-9 systems for level 4, 10 or more for level 5)     Review of Systems   Constitutional:  Negative for chills, diaphoresis and fever. HENT:  Negative for congestion, rhinorrhea and sore throat. Respiratory:  Positive for cough and shortness of breath. Negative for chest tightness and wheezing. Cardiovascular:  Negative for chest pain and leg swelling. Gastrointestinal:  Negative for abdominal pain, constipation, diarrhea, nausea and vomiting. Genitourinary:  Negative for dysuria, frequency and urgency. Musculoskeletal:  Negative for arthralgias and neck pain. Skin:  Negative for rash and wound. Neurological:  Negative for dizziness, weakness and numbness. Psychiatric/Behavioral:  Negative for agitation and behavioral problems. Except as noted above the remainder of the review of systems was reviewed and negative. PAST MEDICAL HISTORY     Past Medical History:   Diagnosis Date    Acid reflux     Anxiety     Depression     Hiatal hernia     Hyperlipidemia     Hypertension     MI (myocardial infarction) ([facility] [address] 2000, 2003    Thyroid disease     Vertigo          SURGICALHISTORY       Past Surgical History:   Procedure Laterality Date    ANGIOPLASTY      CARDIAC SURGERY      bypass and stent    HYSTERECTOMY (CERVIX STATUS UNKNOWN)           CURRENT MEDICATIONS       Previous Medications    ALPRAZOLAM (XANAX) 1 MG TABLET    Take 1 mg by mouth nightly as needed for Sleep. AMITRIPTYLINE (ELAVIL) 50 MG TABLET    Take 50 mg by mouth nightly. BENAZEPRIL (LOTENSIN) 20 MG TABLET    Take 20 mg by mouth daily. CLOPIDOGREL (PLAVIX) 75 MG TABLET    Take 1 tablet by mouth daily.  Start in 1 week    ISOSORBIDE MONONITRATE (IMDUR) 30 MG CR TABLET    Take 30 mg by mouth daily. LEVOTHYROXINE (SYNTHROID) 50 MCG TABLET    Take 50 mcg by mouth Daily. METOPROLOL (TOPROL-XL) 25 MG XL TABLET    Take 1 tablet by mouth 2 times daily. OMEPRAZOLE (PRILOSEC) 20 MG CAPSULE    Take 1 capsule by mouth 2 times daily. SIMVASTATIN (ZOCOR) 80 MG TABLET    Take 80 mg by mouth nightly. TOPIRAMATE (TOPAMAX) 25 MG TABLET    Take 25 mg by mouth 2 times daily. TRIAMTERENE-HYDROCHLOROTHIAZIDE (MAXZIDE-25) 37.5-25 MG PER TABLET    Take 1 tablet by mouth daily. Demerol hcl [meperidine] and Morphine    FAMILY HISTORY     History reviewed. No pertinent family history. SOCIAL HISTORY       Social History     Socioeconomic History    Marital status:      Spouse name: None    Number of children: None    Years of education: None    Highest education level: None   Tobacco Use    Smoking status: Never    Smokeless tobacco: Never   Vaping Use    Vaping Use: Never used   Substance and Sexual Activity    Alcohol use: Not Currently    Drug use: Not Currently    Sexual activity: Not Currently       SCREENINGS    Stephanie Coma Scale  Eye Opening: Spontaneous  Best Verbal Response: Oriented  Best Motor Response: Obeys commands  [place] Coma Scale Score: 15        PHYSICAL EXAM    (up to 7 for level 4, 8 or more for level 5)     ED Triage Vitals [12/13/22 1915]   BP Temp Temp Source Heart Rate Resp SpO2 Height Weight   (!) 145/67 98 °F (36.7 °C) Oral 87 20 93 % 5' 4\" (1.626 m) --       Physical Exam  Vitals and nursing note reviewed. Constitutional:       General: She is not in acute distress. Appearance: She is well-developed. She is not diaphoretic. HENT:      Head: Normocephalic and atraumatic. Mouth/Throat:      Mouth: Mucous membranes are moist.      Pharynx: Oropharynx is clear. Eyes:      Extraocular Movements: Extraocular movements intact. Conjunctiva/sclera: Conjunctivae normal.      Pupils: Pupils are equal, round, and reactive to light. Cardiovascular:      Rate and Rhythm: Normal rate and regular rhythm. Pulses: Normal pulses. Heart sounds: Normal heart sounds. Pulmonary:      Effort: Pulmonary effort is normal. No tachypnea or respiratory distress. Breath sounds: Examination of the right-middle field reveals rhonchi. Examination of the left-middle field reveals rhonchi. Examination of the right-lower field reveals rhonchi. Examination of the left-lower field reveals rhonchi. Rhonchi present. No wheezing or rales. Abdominal:      General: Bowel sounds are normal. There is no distension. Palpations: Abdomen is soft. Tenderness: There is no abdominal tenderness. There is no guarding or rebound. Musculoskeletal:         General: No tenderness. Normal range of motion. Cervical back: Normal range of motion and neck supple. Right lower leg: No edema. Left lower leg: No edema. Skin:     General: Skin is warm and dry. Capillary Refill: Capillary refill takes less than 2 seconds. Findings: No bruising or erythema. Neurological:      Mental Status: She is alert and oriented to person, place, and time. Mental status is at baseline. Cranial Nerves: No cranial nerve deficit. Sensory: No sensory deficit. Motor: No weakness. Deep Tendon Reflexes: Reflexes are normal and symmetric. Psychiatric:         Mood and Affect: Mood normal.         Behavior: Behavior normal.         Thought Content: Thought content normal.       RESULTS     EKG: All EKG's are interpreted by the Emergency Department Physician who either signs or Co-signsthis chart in the absence of a cardiologist.    Sinus rhythm with first-degree AV block and a rate of 84, occasional PAC, normal axis, , QRS 86 and QTc 423, no ST elevations, nonspecific ST changes, minimal voltage criteria for LVH, Q wave lead III, no prior EKG on file.     RADIOLOGY:   Non-plain filmimages such as CT, Ultrasound and MRI are read by the radiologist. Rosette Roy radiographic images are visualized and preliminarily interpreted by the emergency physician with the below findings:      Interpretation per the Radiologist below, if available at the time ofthis note:    CT CHEST WO CONTRAST   Preliminary Result   Consolidative airspace opacities in the right lung, along with tree-in-bud   nodular opacities, compatible with multilobar infection. RECOMMENDATIONS:   Follow-up radiographs recommended to ensure resolution. ED BEDSIDE ULTRASOUND:   Performed by ED Physician - none    LABS:  Labs Reviewed   CBC WITH AUTO DIFFERENTIAL - Abnormal; Notable for the following components:       Result Value    WBC 18.9 (*)     RBC 3.81 (*)     Hemoglobin 10.5 (*)     Hematocrit 33.2 (*)     RDW 17.1 (*)     Neutrophils Absolute 17.2 (*)     All other components within normal limits   COMPREHENSIVE METABOLIC PANEL W/ REFLEX TO MG FOR LOW K - Abnormal; Notable for the following components:    Glucose 196 (*)     BUN 47 (*)     Total Protein 5.0 (*)     Albumin 2.3 (*)     Albumin/Globulin Ratio 0.9 (*)     Alkaline Phosphatase 141 (*)      (*)      (*)     All other components within normal limits   BRAIN NATRIURETIC PEPTIDE - Abnormal; Notable for the following components:    Pro-BNP 38,570 (*)     All other components within normal limits   BLOOD GAS, VENOUS - Abnormal; Notable for the following components:    pCO2, Raúl 32.4 (*)     HCO3, Venous 21 (*)     All other components within normal limits   LACTIC ACID - Abnormal; Notable for the following components:    Lactic Acid 3.8 (*)     All other components within normal limits   COVID-19, RAPID       All other labs were within normal range or not returned as of this dictation.     EMERGENCY DEPARTMENT COURSE and DIFFERENTIAL DIAGNOSIS/MDM:   Vitals:    Vitals:    12/13/22 1915 12/13/22 2015 12/13/22 2035   BP: (!) 145/67 119/89    Pulse: 87 82    Resp: 20 21    Temp: 98 °F (36.7 °C) TempSrc: Oral     SpO2: 93% 94%    Weight:   154 lb 5.2 oz (70 kg)   Height: 5' 4\" (1.626 m)         MDM  Number of Diagnoses or Management Options  Pneumonia of right middle lobe due to infectious organism: new and requires workup  Severe sepsis [facility]): new and requires workup  Diagnosis management comments: COPD exacerbation, asthma, pneumothorax, anaphylaxis, anxiety, PE , pericardial effusion, CHF, ACS/MI, atelectasis, lower airway obstruction, aspiration    Patient seen and evaluated. History and physical as above. Nontoxic and afebrile. Patient arrives tachypneic and with rales in the right lower and middle lung fields. Concern for possible pneumonia. Chest x-ray and outpatient did show pneumonia. Patient's blood work worsened and therefore was sent to the for evaluation. Will obtain routine labs, lactic acid, blood cultures, VBG and CT chest.         Amount and/or Complexity of Data Reviewed  Clinical lab tests: ordered and reviewed  Tests in the radiology section of CPT®: ordered and reviewed  Tests in the medicine section of CPT®: ordered and reviewed  Review and summarize past medical records: yes  Discuss the patient with other providers: yes  Independent visualization of images, tracings, or specimens: yes    Risk of Complications, Morbidity, and/or Mortality  Presenting problems: moderate  Diagnostic procedures: moderate  Management options: moderate    Critical Care  Total time providing critical care: 30-74 minutes          REASSESSMENT     ED Course as of 12/13/22 2042   Tue Dec 13, 2022   2022 Patient's lactic acid elevated 3.8. White count elevated 18.9. Hemoglobin stable 10.5. Creatinine 0.9 with BUN of 47. Sodium 138, potassium 4.3. COVID not detected. VBG with pH 7.41 and PCO2 of 32.4. Bicarb 21. [DS]   2038 Patient CT chest does show right middle airspace disease consistent with pneumonia. Will start patient on Rocephin and azithromycin for antibiotic coverage for pneumonia. Patient updated. Consult placed to hospitalist for admission. [DS]      ED Course User Index  [DS] Josemanuel Batista MD         Is this patient to be included in the SEP-1 Core Measure due to severe sepsis or septic shock? Yes   SEP-1 CORE MEASURE DATA      Sepsis Criteria   Severe Sepsis Criteria   Septic Shock Criteria     Must be confirmed or suspected to move forward with diagnosis of sepsis. Must meet 2:    [] Temperature > 100.9 F (38.3 C)        or < 96.8 F (36 C)  [] HR > 90  [x] RR > 20  [x] WBC > 12 or < 4 or 10% bands      AND:      [x] Infection Confirmed or        Suspected. Must meet 1:    [x] Lactate > 2       or   [] Signs of Organ Dysfunction:    - SBP < 90 or MAP < 65  - Altered mental status  - Creatinine > 2 or increased from      baseline  - Urine Output < 0.5 ml/kg/hr  - Bilirubin > 2  - INR > 1.5 (not anticoagulated)  - Platelets < 659,836  - Acute Respiratory Failure as     evidenced by new need for NIPPV     or mechanical ventilation      [] No criteria met for Severe Sepsis. Must meet 1:    [] Lactate > 4        or   [] SBP < 90 or MAP < 65 for at        least two readings in the first        hour after fluid bolus        administration      [] Vasopressors initiated (if hypotension persists after fluid resuscitation)        [x] No criteria met for Septic Shock. Patient Vitals for the past 6 hrs:   BP Temp Pulse Resp SpO2 Height Weight Percent Weight Change   12/13/22 1915 (!) 145/67 98 °F (36.7 °C) 87 20 93 % 5' 4\" (1.626 m) -- --   12/13/22 2015 119/89 -- 82 21 94 % -- -- --   12/13/22 2035 -- -- -- -- -- -- 154 lb 5.2 oz (70 kg) 0      Recent Labs     12/13/22 1936   WBC 18.9*   LACTA 3.8*   CREATININE 0.9   BILITOT 0.5            Time Severe Sepsis Identified: 8:24 PM EST    Fluid Resuscitation Rational: at least 30mL/kg based on ideal body weight due to obesity defined as BMI >30 (patient's BMI is Body mass index is 28.31 kg/m².  and IBW is Patient weight not recorded)    Repeat lactate level: ordered and pending at this time    Reassessment Exam:   Not applicable. Patient does not have septic shock. CRITICAL CARE TIME   Total Critical Care time was 35 minutes, excluding separatelyreportable procedures. There was a high probability ofclinically significant/life threatening deterioration in the patient's condition which required my urgent intervention. Sepsis    CONSULTS:  IP CONSULT TO HOSPITALIST    PROCEDURES:  Unless otherwise noted below, none     Procedures    I am the Primary Clinician of Record     FINAL IMPRESSION      1. Severe sepsis ([address]    2. Pneumonia of right middle lobe due to infectious organism          DISPOSITION/PLAN   DISPOSITION Decision To Admit 12/13/2022 08:41:04 PM      PATIENT REFERREDTO:  No follow-up provider specified.     DISCHARGEMEDICATIONS:  New Prescriptions    No medications on file          (Please note that portions of this note were completed with a voice recognition program.  Efforts were made to edit the dictations but occasionally words are mis-transcribed.)    Kamini David MD (electronically signed)  Attending Emergency Physician          Kamini David MD  12/13/22 2042

## 2022-12-14 NOTE — PROGRESS NOTES
4 Eyes Skin Assessment     NAME:  Samantha Nuno OF BIRTH:  1945  MEDICAL RECORD NUMBER:  [id number]    The patient is being assessed for  Admission    I agree that One RN have performed a thorough Head to Toe Skin Assessment on the patient. ALL assessment sites listed below have been assessed. Areas assessed by both nurses:    Head, Face, Ears, Shoulders, Back, Chest, Arms, Elbows, Hands, Sacrum. Buttock, Coccyx, Ischium, and Legs. Feet and Heels        Does the Patient have a Wound? Yes wound(s) were present on assessment.  LDA wound assessment was Initiated and completed by RN- Stage 3-4  on coccyx/sacral area, Stage 2 on buttock, scattered bruising, Redness to hermes/ buttock area, Stage 1 on left heel       Fredi Prevention initiated by RN: Yes   Wound Care Orders initiated by RN: Yes    Pressure Injury (Stage 3,4, Unstageable, DTI, NWPT, and Complex wounds) if present place referral order by RN under : Yes    New and Established Ostomies, if present place, referral order under : No      Nurse 1 eSignature: Electronically signed by Suzi Chua RN on 12/14/22 at 6:51 AM EST    **SHARE this note so that the co-signing nurse is able to place an eSignature**    Nurse 2 eSignature: Electronically signed by Vincent Turner RN on 12/14/22 at 7:00 AM EST

## 2022-12-14 NOTE — PROGRESS NOTES
Comprehensive Nutrition Assessment    Type and Reason for Visit:  Wound    Nutrition Recommendations/Plan:   Continue current diet  Johann once daily  Ensure BID     Malnutrition Assessment:  Malnutrition Status: At risk for malnutrition (Comment) (12/14/22 1135)      Nutrition Assessment:    Pt admitted with sepsis, pmh includes CKD IV and CHF. Noted multiple pressue injuries, wound eval pending. Will order Johann once daily for healing. Current diet is Easy to Chew, 4 carb, Cardiac with no PO documented. Family in rooms reports pt ate very little of breakfast this morning, pt states her appetite is WNL. Will montior intakes throughout admission and order Ensure BID d/t increased nutrient needs. Nutrition Related Findings:    LBM 12/13. Trace BLE edema. Glucose 196 Wound Type: Wound Consult Pending       Current Nutrition Intake & Therapies:    Average Meal Intake: 1-25%  Average Supplements Intake: None Ordered  ADULT ORAL NUTRITION SUPPLEMENT; Breakfast, Dinner; Standard High Calorie/High Protein Oral Supplement  ADULT ORAL NUTRITION SUPPLEMENT; Lunch; Wound Healing Oral Supplement  ADULT DIET; Easy to Chew; 4 carb choices (60 gm/meal); Low Fat/Low Chol/High Fiber/BOGDAN    Anthropometric Measures:  Height: 5' 4\" (162.6 cm)  Ideal Body Weight (IBW): 120 lbs (55 kg)    Current Body Weight: 154 lb 5.2 oz (70 kg),   IBW. Weight Source: Bed Scale  Current BMI (kg/m2): 26.5  BMI Categories: Overweight (BMI 25.0-29. 9)    Estimated Daily Nutrient Needs:  Energy Requirements Based On: Kcal/kg  Weight Used for Energy Requirements: Current  Energy (kcal/day): 6240-6836 kcal (20-25 kcal/kg)  Weight Used for Protein Requirements: Current  Protein (g/day):  gm (1.2-1.5 g/kg adjusted for increased needs)  Method Used for Fluid Requirements: 1 ml/kcal  Fluid (ml/day): 5197-5927 mL    Nutrition Diagnosis:   Increased nutrient needs related to increase demand for energy/nutrients as evidenced by wounds    Nutrition Interventions:   Food and/or Nutrient Delivery: Modify Current Diet, Start Oral Nutrition Supplement  Nutrition Education/Counseling: No recommendation at this time  Coordination of Nutrition Care: Continue to monitor while inpatient    Goals:  Goals: PO intake 50% or greater, by next RD assessment    Nutrition Monitoring and Evaluation:   Behavioral-Environmental Outcomes: None Identified  Food/Nutrient Intake Outcomes: Food and Nutrient Intake, Supplement Intake  Physical Signs/Symptoms Outcomes: Biochemical Data, Chewing or Swallowing, Meal Time Behavior, Nutrition Focused Physical Findings, Skin, Weight    Discharge Planning:     Too soon to determine     [place], [address], [place]  Contact: [phone]

## 2022-12-14 NOTE — CONSULTS
Consulting Physician: WINDY Hussein    Reason for Consult: granddaughter feels she is due to get stent exchanged. History of Present Illness: Edgar Mccarty is a 76 y.o. female with history of anxiety, depression, HLD, HTN, CKD stage 4, CAD, hypothyroid, chronic right ureteral stent and chronic simpson catheter who came to the hospital from the nursing home for abnormal chest X-ray and leukocytosis of 21. She had just nura treated for sepsis secondary to a decubitus ulcer. While in ED, granddaughter mentioned she thinks she is due for a right ureteral stent exchange. I reviewed notes in our system and it appears the patient is changing urologists from Dr. Lamont Holguin to Dr. Napoleon Jean [facility]). I did speak with the granddaughter at length and they have not reached out to Dr. Napoleon Jean office yet to get this arranged so I explained if it will be more than a month to get her in, will need arrangement with Dr. Lamont Holguin to exchange. Creatinine is normal at 0.8. No need to exchange stent while hospitalized. Past Medical History:   Past Medical History:   Diagnosis Date    Acid reflux     Anxiety     Depression     Hiatal hernia     Hyperlipidemia     Hypertension     MI (myocardial infarction) ([facility] [address] 2000, 2003    Thyroid disease     Vertigo        Past Surgical History:  Past Surgical History:   Procedure Laterality Date    ANGIOPLASTY      CARDIAC SURGERY      bypass and stent    HYSTERECTOMY (CERVIX STATUS UNKNOWN)         Social History:  Social History     Socioeconomic History    Marital status:       Spouse name: Not on file    Number of children: Not on file    Years of education: Not on file    Highest education level: Not on file   Occupational History    Not on file   Tobacco Use    Smoking status: Never    Smokeless tobacco: Never   Vaping Use    Vaping Use: Never used   Substance and Sexual Activity    Alcohol use: Not Currently    Drug use: Not Currently    Sexual activity: Not Currently Other Topics Concern    Not on file   Social History Narrative    Not on file     Social Determinants of Health     Financial Resource Strain: Not on file   Food Insecurity: Not on file   Transportation Needs: Not on file   Physical Activity: Not on file   Stress: Not on file   Social Connections: Not on file   Intimate Partner Violence: Not on file   Housing Stability: Not on file       Family History:  History reviewed. No pertinent family history.     Meds:   Current Facility-Administered Medications: [START ON 12/15/2022] vancomycin (VANCOCIN) 1,250 mg in dextrose 5 % 250 mL IVPB (ADDAVIAL), 1,250 mg, IntraVENous, Q24H  vancomycin (VANCOCIN) intermittent dosing (placeholder), , Other, RX Placeholder  azithromycin (ZITHROMAX) 500 mg in D5W 250ml addavial, 500 mg, IntraVENous, Q24H  [COMPLETED] cefepime (MAXIPIME) 2000 mg IVPB minibag, 2,000 mg, IntraVENous, Once **FOLLOWED BY** cefepime (MAXIPIME) 2000 mg IVPB minibag, 2,000 mg, IntraVENous, Q12H  atorvastatin (LIPITOR) tablet 40 mg, 40 mg, Oral, QHS  carvedilol (COREG) tablet 6.25 mg, 6.25 mg, Oral, BID WC  clopidogrel (PLAVIX) tablet 75 mg, 75 mg, Oral, Daily  hydrALAZINE (APRESOLINE) tablet 25 mg, 25 mg, Oral, TID  hydrOXYzine HCl (ATARAX) tablet 25 mg, 25 mg, Oral, Q8H PRN  ipratropium-albuterol (DUONEB) nebulizer solution 3 mL, 1 vial, Inhalation, TID PRN  levothyroxine (SYNTHROID) tablet 88 mcg, 88 mcg, Oral, Daily  NIFEdipine (PROCARDIA XL) extended release tablet 30 mg, 30 mg, Oral, Daily  pantoprazole (PROTONIX) tablet 40 mg, 40 mg, Oral, QAM AC  topiramate (TOPAMAX) tablet 50 mg, 50 mg, Oral, Nightly  topiramate (TOPAMAX) tablet 25 mg, 25 mg, Oral, QAM AC  traZODone (DESYREL) tablet 100 mg, 100 mg, Oral, Nightly  furosemide (LASIX) injection 40 mg, 40 mg, IntraVENous, BID  sodium chloride flush 0.9 % injection 5-40 mL, 5-40 mL, IntraVENous, 2 times per day  sodium chloride flush 0.9 % injection 5-40 mL, 5-40 mL, IntraVENous, PRN  0.9 % sodium chloride infusion, , IntraVENous, PRN  enoxaparin (LOVENOX) injection 40 mg, 40 mg, SubCUTAneous, Daily  acetaminophen (TYLENOL) tablet 650 mg, 650 mg, Oral, Q6H PRN **OR** acetaminophen (TYLENOL) suppository 650 mg, 650 mg, Rectal, Q6H PRN    Vitals:  /68   Pulse 75   Temp 98.9 °F (37.2 °C) (Oral)   Resp 18   Ht 5' 4\" (1.626 m)   Wt 154 lb 8.7 oz (70.1 kg)   SpO2 92%   BMI 26.53 kg/m²   No intake or output data in the 24 hours ending 12/14/22 1251    Review of Systems:  10 Systems were reviewed and negative except as in HPI      Physical Exam:  General Appearance: Alert and forgetful, cooperative, no distress, appears stated age  Head: Normocephalic, without obvious abnormality, atraumatic  Back: no CVA tenderness  Lungs: respirations unlabored, no wheezing  Heart: Regular rate and rhythm, no lower extremity edema noted  Abdomen: Soft, non-tender, non-distended, no masses  Skin: Skin color, texture, turgor normal, no rashes or lesions  Neurologic: no gross deficits  Female :   Bladder is non tender  No CVA tenderness  Gurrola catheter intact with yellow output- was exchanged this morning by RN.   Pelvic Exam Not Indicated    Labs:  CBC   Lab Results   Component Value Date/Time    WBC 15.9 12/14/2022 07:18 AM    RBC 3.49 12/14/2022 07:18 AM    HGB 9.6 12/14/2022 07:18 AM    HCT 30.4 12/14/2022 07:18 AM    MCV 87.2 12/14/2022 07:18 AM    MCH 27.6 12/14/2022 07:18 AM    MCHC 31.6 12/14/2022 07:18 AM    RDW 16.9 12/14/2022 07:18 AM     12/14/2022 07:18 AM    MPV 7.6 12/14/2022 07:18 AM     BMP   Lab Results   Component Value Date/Time     12/14/2022 07:18 AM    K 4.0 12/14/2022 07:18 AM     12/14/2022 07:18 AM    CO2 20 12/14/2022 07:18 AM    BUN 42 12/14/2022 07:18 AM    CREATININE 0.8 12/14/2022 07:18 AM    GLUCOSE 120 12/14/2022 07:18 AM    CALCIUM 8.9 12/14/2022 07:18 AM       Urinalysis:   Lab Results   Component Value Date/Time    COLORU Yellow 12/14/2022 10:11 AM    GLUCOSEU Negative 12/14/2022 10:11 AM    BLOODU MODERATE 12/14/2022 10:11 AM    NITRU Negative 12/14/2022 10:11 AM    LEUKOCYTESUR LARGE 12/14/2022 10:11 AM       Imaging: NA   Impression/Plan:   - 77y. o. female with pneumonia. Consulted per granddaughter request - due for ureteral stent exchange. - Explained renal function is normal at this time and stent was exchanged in September. She will call Dr. Kamala Larkin office and if unable to get in within the month, she will get this arranged with Dr. Elena Escalante.    - no urgent need to exchange while hospitalized    WINDY Sim - CNP 12/14/202212:51 PM

## 2022-12-14 NOTE — PROGRESS NOTES
Pharmacy Medication Reconciliation Note     List of medications Arianna Laguna is currently taking is complete. [facility]    Notes regarding home medications:   1. Facility reports all AM meds administered PTA in the ED  2.  Patient started on azithromycin today--took 500 mg today and is due for 250 mg x4 more days for PNA diagnosis     Patient denies taking any OTC or herbal medications    Emmanuel Villareal, Pharmacy Intern  12/13/2022  9:02 PM

## 2022-12-14 NOTE — PROGRESS NOTES
New order for U/A . Gurrola replaced  16F Gurrola placed with return of small amt cloudy nicole urine with thick sediment present. Not enough in bag yet for specimen .  Will notify oncoming RN

## 2022-12-14 NOTE — CONSULTS
Clinical Pharmacy Note  Vancomycin Consult    Ruth Felix is a 76 y.o. female ordered Vancomycin for HAP; consult received from [facility] to manage therapy. Also receiving cefepime. Allergies:  Demerol hcl [meperidine] and Morphine     Temp max:  Temp (24hrs), Av °F (36.7 °C), Min:98 °F (36.7 °C), Max:98 °F (36.7 °C)      Recent Labs     22   WBC 18.9*       Recent Labs     22   BUN 47*   CREATININE 0.9       No intake or output data in the 24 hours ending 22 0335    Culture Results:  pending    Ht Readings from Last 1 Encounters:   22 5' 4\" (1.626 m)        Wt Readings from Last 1 Encounters:   22 154 lb 5.2 oz (70 kg)         Estimated Creatinine Clearance: 49 mL/min (based on SCr of 0.9 mg/dL). Assessment  -75 y/o F with extensive PMH presented to ED with concerns of abnormal chest x-ray and elevated WBC (21)  -Recently finished course of meropenem 1g IV Q8H (-) for advanced decubitus ulcer  -Imaging revealed right upper and lower lobe PNA  -Renal function appropriate (Scr 0.9 mg/dL, CrCl 49 ml/min)    Plan:  Day # 1 of vancomycin. Vancomycin 1250 mg IV every 24 hours ordered. Goal -600  Predicted   Will order level after 2 total doses (0000 on ) --> if Scr worsens level may need to be pushed to after 1 dose    Thank you for the consult.    Amita Figueroa, PharmD  2022 3:51 AM

## 2022-12-14 NOTE — CARE COORDINATION
[address] [id number] Continence Nurse  Consult Note       NAME:  Sabine Ewing RECORD NUMBER:  [id number]  AGE: 76 y.o. GENDER: female  : 1945  TODAY'S DATE:  2022    Subjective   Reason for WOCN Evaluation and Assessment: coccyx decub      Ron Quesada is a 76 y.o. female referred by:   [] Physician  [x] Nursing  [] Other:     Wound Identification:  Wound Type: pressure  Contributing Factors: chronic pressure, decreased mobility, and incontinence of stool    Wound History: Chronic sacral wound, per patient treated by outpatient wound care. Current Wound Care Treatment:  UTD    Patient Goal of Care:  [x] Wound Healing  [] Odor Control  [] Palliative Care  [] Pain Control   [] Other:         PAST MEDICAL HISTORY        Diagnosis Date    Acid reflux     Anxiety     Depression     Hiatal hernia     Hyperlipidemia     Hypertension     [facility]    Thyroid disease     Vertigo        PAST SURGICAL HISTORY    Past Surgical History:   Procedure Laterality Date    ANGIOPLASTY      CARDIAC SURGERY      bypass and stent    HYSTERECTOMY (CERVIX STATUS UNKNOWN)         FAMILY HISTORY    History reviewed. No pertinent family history. SOCIAL HISTORY    Social History     Tobacco Use    Smoking status: Never    Smokeless tobacco: Never   Vaping Use    Vaping Use: Never used   Substance Use Topics    Alcohol use: Not Currently    Drug use: Not Currently       ALLERGIES    Allergies   Allergen Reactions    Demerol Hcl [Meperidine] Palpitations    Morphine Palpitations       MEDICATIONS    No current facility-administered medications on file prior to encounter.      Current Outpatient Medications on File Prior to Encounter   Medication Sig Dispense Refill    sodium hypochlorite (DAKINS) 0.125 % SOLN external solution Apply 1 each topically daily Apply to sacrum Q day      guaiFENesin (MUCINEX) 600 MG extended release tablet Take 1,200 mg by mouth 2 times daily ipratropium-albuterol (DUONEB) 0.5-2.5 (3) MG/3ML SOLN nebulizer solution Inhale 1 vial into the lungs 3 times daily as needed for Shortness of Breath      hydrOXYzine HCl (ATARAX) 25 MG tablet Take 25 mg by mouth every 8 hours as needed for Itching      Balsam [place]-Castor Oil (VENELEX) OINT ointment Apply 1 application topically 2 times daily Apply to buttocks      carvedilol (COREG) 6.25 MG tablet Take 6.25 mg by mouth 2 times daily (with meals)      topiramate (TOPAMAX) 25 MG tablet Take 50 mg by mouth nightly TDD 75 mg      traZODone (DESYREL) 100 MG tablet Take 100 mg by mouth nightly      levothyroxine (SYNTHROID) 88 MCG tablet Take 88 mcg by mouth Daily      HYDROcodone-acetaminophen (NORCO) 5-325 MG per tablet Take 1 tablet by mouth every 6 hours as needed for Pain.      pantoprazole (PROTONIX) 40 MG tablet Take 40 mg by mouth daily      dexamethasone (DECADRON) 6 MG tablet Take 6 mg by mouth daily (with breakfast)      clopidogrel (PLAVIX) 75 MG tablet Take 75 mg by mouth daily      NIFEdipine (ADALAT CC) 30 MG extended release tablet Take 30 mg by mouth daily      torsemide (DEMADEX) 20 MG tablet Take 20 mg by mouth daily      potassium chloride (KLOR-CON M) 20 MEQ extended release tablet Take 20 mEq by mouth daily      isosorbide mononitrate (IMDUR) 60 MG extended release tablet Take 60 mg by mouth daily      zinc gluconate 50 MG tablet Take 50 mg by mouth daily      atorvastatin (LIPITOR) 40 MG tablet Take 40 mg by mouth nightly      vitamin C (ASCORBIC ACID) 500 MG tablet Take 1,000 mg by mouth daily      Cholecalciferol (VITAMIN D) 50 MCG (2000 UT) CAPS capsule Take 1 capsule by mouth daily      nabumetone (RELAFEN) 500 MG tablet Take 500 mg by mouth daily as needed for Pain      hydrALAZINE (APRESOLINE) 25 MG tablet Take 25 mg by mouth 3 times daily      topiramate (TOPAMAX) 25 MG tablet Take 25 mg by mouth every morning (before breakfast) TDD 75 mg         Objective    /68   Pulse 75   Temp 98.9 °F (37.2 °C) (Oral)   Resp 18   Ht 5' 4\" (1.626 m)   Wt 154 lb 8.7 oz (70.1 kg)   SpO2 92%   BMI 26.53 kg/m²     LABS:  WBC:    Lab Results   Component Value Date/Time    WBC 15.9 12/14/2022 07:18 AM     H/H:    Lab Results   Component Value Date/Time    HGB 9.6 12/14/2022 07:18 AM    HCT 30.4 12/14/2022 07:18 AM     PTT:    Lab Results   Component Value Date/Time    APTT 32.0 03/16/2014 09:50 PM   [APTT}  PT/INR:    Lab Results   Component Value Date/Time    PROTIME 11.5 03/16/2014 09:50 PM    INR 1.07 03/16/2014 09:50 PM     HgBA1c:  No results found for: LABA1C    Assessment   Fredi Risk Score:      Patient Active Problem List   Diagnosis Code    [facility] A[facility].9    HAP (hospital-acquired pneumonia) J18.9, Y95    Decubital ulcer L89.90       Measurements:  Wound 12/13/22 Coccyx Mid tunneling wound on coccyx pink red in color (Active)   Wound Image   12/14/22 1220   Wound Etiology Pressure Stage 4 12/14/22 1220   Dressing Status New dressing applied 12/14/22 1220   Wound Cleansed Cleansed with saline 12/14/22 1220   Dressing/Treatment Hydrofiber Ag;ABD 12/14/22 1220   Dressing Change Due 12/16/22 12/14/22 1220   Wound Length (cm) 4.5 cm 12/14/22 1220   Wound Width (cm) 4 cm 12/14/22 1220   Wound Depth (cm) 1 cm 12/14/22 1220   Wound Surface Area (cm^2) 18 cm^2 12/14/22 1220   Wound Volume (cm^3) 18 cm^3 12/14/22 1220   Undermining Starts ___ O'Clock 9 12/14/22 1220   Undermining Ends___ O'Clock 3 12/14/22 1220   Undermining Maxium Distance (cm) 2.5 12/14/22 1220   Wound Assessment Exposed structure bone;Pink/red;Slough 12/14/22 1220   Drainage Amount Small 12/14/22 1220   Drainage Description Serosanguinous 12/14/22 1220   Odor None 12/14/22 1220   Melvi-wound Assessment Denuded 12/14/22 1220   Margins Defined edges 12/14/22 1220   Number of days: 0     Pt awake and alert in bed. Pt sacral wound with palpable bone. Appears clean, no odor, small amount of serosanguinous drainage. Minimal slough.  Wound dressed with aquacel ag, abd.           Plan   Plan of Care:   Sacral wound: aquace ag packed, abd, change every other day or PRN if stool  Can apply triad to periwound edges. -turn and reposition every 2 hours  -chair cushion, encourage to reposition self frequently while in chair  -elevate heels, apply liquid barrier film twice daily: heel protectors  Will continue to follow. Specialty Bed Required : Yes   [x] Low Air Loss   [] Pressure Redistribution  [] Fluid Immersion  [] Bariatric  [] Total Pressure Relief  [] Other:     Current Diet: ADULT ORAL NUTRITION SUPPLEMENT; Breakfast, Dinner; Standard High Calorie/High Protein Oral Supplement  ADULT ORAL NUTRITION SUPPLEMENT; Lunch; Wound Healing Oral Supplement  ADULT DIET; Easy to Chew; 4 carb choices (60 gm/meal);  Low Fat/Low Chol/High Fiber/BOGDAN  Dietician consult:  Yes    Discharge Plan:  Placement for patient upon discharge: TBD    Patient appropriate for Outpatient [address]: Yes    Referrals:  []   [] [facility]  [] Supplies  [] Other    Patient/Caregiver Teaching:  Level of patient/caregiver understanding able to:   [] Indicates understanding       [] Needs reinforcement  [] Unsuccessful      [x] Verbal Understanding  [] Demonstrated understanding       [] No evidence of learning  [] Refused teaching         [] N/A       Electronically signed by Elver Vernon RN, CWOCN on 12/14/2022 at 12:24 PM

## 2022-12-14 NOTE — PROGRESS NOTES
This RN was notified of UA needed, and this RN obtained one. Results came back positive. After talking to Aileen Rudolph CNP  with Urology, patient has a stent placement therefore urine may always look to be infected. OK to continue the abx she is currently on. No need to add another.

## 2022-12-14 NOTE — PROGRESS NOTES
Pt arrived from the ER without incident. Is alert and oriented x 4 but forgetful. VSS. Bed in low locked position . Call light within reach.  Bed alarm on

## 2022-12-14 NOTE — PROGRESS NOTES
Facility/Department: [facility]  Initial Assessment  DYSPHAGIA BEDSIDE SWALLOW EVALUATION     Patient: Lesli Dumont   : 1945   MRN: [id number]      Evaluation Date: 2022   Admitting Diagnosis: Sepsis ([facility] [address] [A41.9]  Severe sepsis ([facility] [address] [A41.9, R65.20]  Pneumonia of right middle lobe due to infectious organism [J18.9]  Pain: Reported stomach/gas pain, nurse notified                                                     Chart Review:   H&P:   History Of Present Illness: The patient is a 76 y.o. female who presents to [facility] with PMHx: Anxiety, CKD stage IV, MDD, CAD, HF, hypothyroid, GERD, PVD, HLD, GERD, hiatal hernia, MI, vertigo, right renal stent, indwelling Gurrola catheter. Patient was sent in from the [facility] with concerns of an abnormal chest x-ray and leukocytosis of 21. Patient had just recently finished antibiotic therapy for sepsis secondary to an advanced decubitus ulcer  Patient also has developed a very wet sounding cough in the last 2 to 3 days     Current Diet Level (prior to evaluation): Easy to chew  Thin liquids    Respiratory Status:   [x]Room Air - 2022   []O2 via nasal cannula   []Other:    Imaging:  CT Chest: 2022  Impression   Consolidative airspace opacities in the right lung, along with tree-in-bud   nodular opacities, compatible with multilobar infection. Lungs/pleura: The central airways are patent. No definite pleural effusion. No pneumothorax is seen. There are consolidative airspace opacities in the   right lung, predominantly in the right upper and lower lobes. Small   tree-in-bud nodular opacities are also noted in the right lung. Modified Barium Swallow Study: MBS completed 2022 per [facility] review. [id number] [facility]    Patient has clear vocal quality. Patient has intact facial symmetry.  Patient has intact lingual and labial ROM, coordination, and strength    Patient has intact oral phase of swallow with adequate labial seal, no anterior spillage. Patient has adequate bolus manipulation and formation. Patient has adequate mastication of regular textures. Patient has delayed swallow onset with thin liquids as bolus head reaches pyriform sinuses. Patient has deep laryngeal penetration with cup and straw sip of thin liquids. Patient has x1 trace spec of barium remain in laryngeal vestibule post straw sip thin. Patient is then noted to cough. When fluoro re-initiated no aspiration is noted. Patient has incomplete laryngeal vestibule closure. Patient has adequate base of tongue retraction, pharyngeal peristalsis, UES Opening and clearance. SLP recommends level 7 regular textures and level 0 thin liquids      Reason for referral: SLP evaluation orders received due to new diagnosis of PNA     History/Prior Level of Function:   Living Status: pt lives at [facility]: pt reports soft/bite size with thin liquids  Prior Dysphagia History: unable to locate records, pt reports modified diet at [facility]      Dysphagia Impressions/Diagnosis: Oropharyngeal Dysphagia   OROPHARYNGEAL DYSPHAGIA DIAGNOSIS:   Pt awake, pleasant, cooperative and accepting of assessment. Pt seen bedside with poor positioning d/t decub ulcer. Upright in bed, approx 45 degrees for all PO trials. Mild-mod oral dysphagia and mild pharyngeal dysphagia suspected. Pt fully edentulous and reports difficulty with mastication of solids. Suspect premature loss of bolus to pharynx, delayed initiation and reduced elevation. Prolonged mastication of easy to chew solids with reduced bolus cohesion, and delayed A-P transit. NO s/s aspiration or changes in vocal quality with any PO trials on this date. Pt noted to have audible breathing upon SLP arrival and wet cough prior to any PO trials. Recommend downgrade to soft/bite size solids and continue with thin liquids with pt positioned as upright as possible with all PO.    Monitor closely for any s/s aspiration with PO intake and any changes in respiratory status. If any issues arise, recommend MBS. SLP to follow for dysphagia tx/diet tolerance. 2.  MEDICAL OR COGNITIVE/BEHAVIORAL FACTORS WHICH CAN EXACERBATE:   Reduced ability to sit fully upright for po intake. Limited dentition  Silent aspiration cannot be ruled out during a bedside assessment. If concerns for aspiration pneumonia or changes in respiratory function, MBS may be indicated and will need to be ordered. Recommended Diet and Intervention 12/14/2022:  Diet Solids Recommendation:  Dysphagia III Soft and bite sized  Liquid Consistency Recommendation: Thin liquids  Recommended form of Meds: Meds crushed as able in puree      Compensatory Swallowing Strategies:  Upright as possible with all PO intake , Small bites/sips , Eat/feed slowly, Remain upright 30-45 min     SHORT TERM DYSPHAGIA GOALS/PLAN OF CARE: Speech therapy for dysphagia tx 3-5 times per week during acute care stay. Goals  Pt will functionally tolerate recommended diet with no overt clinical s/s of aspiration   Pt will demonstrate understanding of aspiration risk and precautions via education/demonstration with occasional prompting     Dysphagia Therapeutic Intervention:  Diet Tolerance Monitoring , Patient/Family Education , Therapeutic Trials with SLP     Dysphagia Prognosis: [] good [x]fair  []guarded []poor  Barriers to progress: poor positioning, medical complications, reduced insight    Discharge Recommendations: Recommend ongoing SLP for dysphagia therapy upon discharge from hospital       TEST DATA  Vision: [facility] for assessment  Hearing: [facility] for assessment    Consistencies Presented:    Thin liquid; rapid ingestion/consecutive sips via straw, suspect premature loss of bolus to pharynx, delayed initiation, reduced laryngeal elevation, NO s/s aspiration or changes in vocal quality  Puree food; adequate oral clearance, suspect premature loss of bolus to pharynx, delayed initiation, reduced laryngeal elevation, NO s/s aspiration or changes in vocal quality  Easy to chew food; prolonged mastication, reduced bolus cohesion, adequate oral clearance, suspect premature loss of bolus to pharynx, delayed initiation, reduced laryngeal elevation, NO s/s aspiration or changes in vocal quality  Regular solid food; DNT      Cognitive/behavioral:   [x]orientation [x] to self [x] place [] date [x] reason for admit [x] purpose of evaluation  []distractible  [x]verbally responsive  [x]follows one step commands  []agitated  []impulsive  [] other:     Patient Positioning: Upright in bed approx 45 degrees    Dentition:  []Adequate  []Dentures   []Missing Many Teeth  [x]Edentulous  []Other:    Baseline Vocal Quality:  [x]Normal  []Dysphonic   []Aphonic   []Hoarse  []Wet  []Weak  []Other:    Volitional Cough:  [x]Strong  []Weak  []Wet  []Absent  []Congested  []Other:    Volitional Swallow:   []Absent   [x]Delayed     []Adequate     []Required use of drink     Oral Mechanism Exam:  []WFL [x]Mild   [] Moderate  []Severe  []To be assessed  Impaired:   []Left side      []Right side    [x]Labial ROM/Coordination/strength   []Labial Symmetry   [x]Lingual ROM/Coordination/strength   []Lingual Symmetry  []Gag  []Other:     Oral Phase: []WFL [x]Mild   [x] Moderate  []Severe  []To be assessed   [x]Impaired/Prolonged Mastication: with easy to chew solids   []Spillage Left:   []Spillage Right:  []Pocketing Left:   []Pocketing Right:   [x]Decreased Anterior to Posterior Transit: with easy to chew solids   [x]Suspected Premature Bolus Loss: all consistencies   []Lingual/Palatal Residue:   []Other:     Pharyngeal Phase: []WFL [x]Mild   [] Moderate  []Severe  []To be assessed   [x]Delayed Swallow:   [x]Suspected Pharyngeal Pooling:   [x]Decreased Laryngeal Elevation:   []Absent Swallow:  []Wet Vocal Quality:   []Throat Clearing-Immediate:   []Throat Clearing-Delayed:   []Cough-Immediate:   []Cough-Delayed:  []Change in Vital Signs:  []Suspected Delayed Pharyngeal Clearing:  []Other:       Eating Assistance:  []Independent  [x]Setup or clean-up assistance   [x] Supervision or touching assistance   [] Partial or moderate assistance   [] Substantial or maximal assistance  [] Dependent       EDUCATION:   Provided education regarding role of SLP, results of assessment, recommendations and general speech pathology plan of care. [x] Pt verbalized understanding and agreement   [x] Pt requires ongoing learning   [] No evidence of comprehension     If patient discharges prior to next visit, this note will serve as discharge.      Timed Code Minutes: 0  Total Treatment Minutes: 19    Time in: 0508  Time out: 1316    Electronically signed by:    Liza Davis [place] [id number] [facility] #[id number]  Speech-Language Pathologist  12/14/22  1:16pm

## 2022-12-14 NOTE — PROGRESS NOTES
Family was at bedside and an argument ensued. The sister, Zoie Baig, walked outside demanding that the granddaughter, [address] be escorted out by security. Yeimy Barreto RN and this Primary RN walked into the room to ask what was going on. It was stated that either they both choose to get along and stay in the room, or they both choose to leave. Family decided to stay in the room. Roughly 10 minutes after decision was made, granddaughter [address] P[address] and friend Emmer Son) decided to leave. Sister Key Figueroa) decided to stay in the room and chat. She shut the door and nursing staff was unable to hear the conversation. Zoie Baig left approximately 5 minutes after shutting the door. Not a minute later the patient called and asked for a  to come pray with her as \"her whole family deserted her\". Rn walked into the room to notify patient that a  is not here at this time but can come see her as soon as the day starts tomorrow. Patient then stated \"my sister called me an effin bitch\". Zoie Baig is to not have any visiting privileges while patient is in the hospital for the current stay. Patient and family members in agreement. Zoie Baig will be escorted out by security if she is to visit and not leave accordingly .

## 2022-12-14 NOTE — PROGRESS NOTES
Physical Therapy  Facility/Department: [address] MED SURG  Physical Therapy Initial and Discharge Assessment    Name: Tiffany Sanon  : 1945  MRN: [id number]  Date of Service: 2022    Discharge Recommendations:  No therapy recommended at discharge, [address] without PT   Tiffany Sanon scored a 7/24 on the AM-PAC short mobility form. At this time, no further PT is recommended upon discharge due to the pt declining services. Recommend patient returns to prior setting with prior services. PT Equipment Recommendations  Equipment Needed: No      Patient Diagnosis(es): The primary encounter diagnosis was Severe sepsis ([address] A diagnosis of Pneumonia of right middle lobe due to infectious organism was also pertinent to this visit. Past Medical History:  has a past medical history of Acid reflux, Anxiety, Depression, Hiatal hernia, Hyperlipidemia, Hypertension, MI (myocardial infarction) ([facility] [address] Thyroid disease, and Vertigo. Past Surgical History:  has a past surgical history that includes Hysterectomy; Cardiac surgery; and angioplasty. Assessment   Assessment: The pt is a 75 yo female who presented to the ED from her facility with abd CXR and leukocytosis. The pt has had a wet sounding cough x 2-3 days but had just finished a course of abx for a sacral decubitus. CT chest concerning for opacities in the right upper lobe and right lower lobe. A T11 endplate compression deformity noted. The pt is from [address] [place] [address] and is a total assist for all mobility; non-ambulatory for awhile. The pt no longer gets PT at the facility. PMHx: anxiety/depression, hiatal hernia, HTN, MI, thyroid disease, vertigo, cardiac stents, sacral decub, right ureteral stent, CKD stage 4, chronic simpson catheter    Today, the pt demonstrated that she is functioning at her baseline. She declined seated EOB or out of bed > chair (although with her stage 3-4 sacral decub she most likely should not be seated in the chair).  She is max A of 2 for rolling and for repositioning in the bed. The pt was dependent for clean-up following a BM. In discussing con't PT while here in the hospital, the pt declined and reported she does not want therapy anymore and does not want therapy upon return to the F. If nursing has need for the pt to be out of bed, anticipate that she will need the maxi-lift. Will d/c from acute care PT services due to no needs and per pt request.  Decision Making: Low Complexity  Barriers to Learning: memory  Requires PT Follow-Up: No  Activity Tolerance  Activity Tolerance: Patient limited by pain; Other (comment)  Activity Tolerance Comments: the pt appears anxious     Plan   Physcial Therapy Plan  Additional Comments: d/c from acute care PT services  Safety Devices  Type of Devices: Bed alarm in place, Call light within reach, Nurse notified, Left in bed, Heels elevated for pressure relief     Restrictions  Restrictions/Precautions  Restrictions/Precautions: Fall Risk, Bed Alarm  Position Activity Restriction  Other position/activity restrictions: stage 3-4 sacral wound; simpson, IV     Subjective   General  Chart Reviewed: Yes  Additional Pertinent Hx: Per WINDY Sandoval CNP H&P on 12-: The pt is a 77 yo female who presented to the ED from her facility with abd CXR and leukocytosis. The pt has had a wet sounding cough x 2-3 days but had just finished a course of abx for a sacral decubitus. CT chest concerning for opacities in the right upper lobe and right lower lobe. A T11 endplate compression deformity noted.     PMHx: anxiety/depression, hiatal hernia, HTN, MI, thyroid disease, vertigo, cardiac stents, sacral decub, right ureteral stent, CKD stage 4, chronic simpson catheter  Response To Previous Treatment: Not applicable  Family / Caregiver Present: Yes  Referring Practitioner: WINDY Sandoval CNP  Referral Date : 12/13/22  Diagnosis: HCAP, sepsis, CHF, sacral decubitus  Follows Commands: Within Functional Limits  Subjective  Subjective: reporting 10/10 pain in her bottom from wound and also reporting dizziness and SOB just laying in the bed         Social/Functional History  Social/Functional History  Type of Home: Facility [facility], [facility])  Has the patient had two or more falls in the past year or any fall with injury in the past year?: No  Receives Help From: Other (comment) (quit therapy ~2 months ago)  ADL Assistance: Needs assistance (total care; questionable if the pt gets a shower or a bedbath; can feed self)  Ambulation Assistance: Non-ambulatory (mostly stays in the bed; when in the w/c she could propel self)  Transfer Assistance: Needs assistance (asissts of 2 for a transfer)  Active : No  Vision/Hearing  Vision  Vision: Impaired  Vision Exceptions: Wears glasses for reading (doesn't wear glasses anymore)  Hearing  Hearing: Within functional limits    Cognition   Orientation  Overall Orientation Status: Impaired  Orientation Level: Oriented to time;Oriented to place; Disoriented to situation;Oriented to person (not completely oriented to situation)  Cognition  Overall Cognitive Status: Exceptions  Following Commands: Follows one step commands with increased time; Follows one step commands with repetition  Attention Span: Attends with cues to redirect  Memory: Decreased short term memory;Decreased recall of recent events  Initiation: Requires cues for some     Objective           Gross Assessment  PROM: Generally decreased, functional  Strength: Grossly decreased, non-functional       Bed mobility  Rolling to Left: Maximum assistance;2 Person assistance  Rolling to Right: Maximum assistance;2 Person assistance  Scooting: Dependent/Total;2 Person assistance  Transfers  Bed to Chair: Unable to assess  Comment: the pt declined seated EOB or up to the chair; the pt has a stage 3-4 sacral decubitus and most likely not appropriate for up in the chair  Ambulation  Comments: non-ambulatory at baseline        AM-PAC Score  AM-PAC Inpatient Mobility Raw Score : 7 (12/14/22 1458)  AM-PAC Inpatient T-Scale Score : 26.42 (12/14/22 1458)  Mobility Inpatient CMS 0-100% Score: 92.36 (12/14/22 1458)  Mobility Inpatient CMS G-Code Modifier : CM (12/14/22 1458)          Goals  Short Term Goals  Time Frame for Short Term Goals: no acute care PT goals identified; the pt declines further skilled PT services  Patient Goals   Patient Goals : none stated       Education  Patient Education  Education Given To: Patient  Education Provided: Role of Therapy  Education Method: Verbal  Barriers to Learning: Other (Comment) (anxiety)      Therapy Time   Individual Concurrent Group Co-treatment   Time In 1441         Time Out 1506         Minutes 25         Timed Code Treatment Minutes: 10 Minutes     Electronically signed by Peyton Macedo25 on 12/14/2022 at 3:07 PM

## 2022-12-15 LAB
A/G RATIO: 1.1 (ref 1.1–2.2)
ALBUMIN SERPL-MCNC: 2.2 G/DL (ref 3.4–5)
ALP BLD-CCNC: 109 U/L (ref 40–129)
ALT SERPL-CCNC: 92 U/L (ref 10–40)
ANION GAP SERPL CALCULATED.3IONS-SCNC: 14 MMOL/L (ref 3–16)
AST SERPL-CCNC: 56 U/L (ref 15–37)
BASOPHILS ABSOLUTE: 0 K/UL (ref 0–0.2)
BASOPHILS RELATIVE PERCENT: 0.1 %
BILIRUB SERPL-MCNC: 0.7 MG/DL (ref 0–1)
BUN BLDV-MCNC: 38 MG/DL (ref 7–20)
CALCIUM SERPL-MCNC: 8.1 MG/DL (ref 8.3–10.6)
CHLORIDE BLD-SCNC: 101 MMOL/L (ref 99–110)
CO2: 20 MMOL/L (ref 21–32)
CREAT SERPL-MCNC: 0.8 MG/DL (ref 0.6–1.2)
EOSINOPHILS ABSOLUTE: 0 K/UL (ref 0–0.6)
EOSINOPHILS RELATIVE PERCENT: 0.2 %
GFR SERPL CREATININE-BSD FRML MDRD: >60 ML/MIN/{1.73_M2}
GLUCOSE BLD-MCNC: 87 MG/DL (ref 70–99)
HCT VFR BLD CALC: 31 % (ref 36–48)
HEMOGLOBIN: 9.9 G/DL (ref 12–16)
L. PNEUMOPHILA SEROGP 1 UR AG: NORMAL
LYMPHOCYTES ABSOLUTE: 1.8 K/UL (ref 1–5.1)
LYMPHOCYTES RELATIVE PERCENT: 14 %
MAGNESIUM: 1.8 MG/DL (ref 1.8–2.4)
MCH RBC QN AUTO: 27.6 PG (ref 26–34)
MCHC RBC AUTO-ENTMCNC: 31.9 G/DL (ref 31–36)
MCV RBC AUTO: 86.7 FL (ref 80–100)
MONOCYTES ABSOLUTE: 0.5 K/UL (ref 0–1.3)
MONOCYTES RELATIVE PERCENT: 4.1 %
NEUTROPHILS ABSOLUTE: 10.7 K/UL (ref 1.7–7.7)
NEUTROPHILS RELATIVE PERCENT: 81.6 %
PDW BLD-RTO: 16.8 % (ref 12.4–15.4)
PLATELET # BLD: 166 K/UL (ref 135–450)
PMV BLD AUTO: 7.5 FL (ref 5–10.5)
POTASSIUM REFLEX MAGNESIUM: 3.5 MMOL/L (ref 3.5–5.1)
RBC # BLD: 3.58 M/UL (ref 4–5.2)
SODIUM BLD-SCNC: 135 MMOL/L (ref 136–145)
STREP PNEUMONIAE ANTIGEN, URINE: NORMAL
TOTAL PROTEIN: 4.2 G/DL (ref 6.4–8.2)
URINE CULTURE, ROUTINE: NORMAL
WBC # BLD: 13.1 K/UL (ref 4–11)

## 2022-12-15 PROCEDURE — 6370000000 HC RX 637 (ALT 250 FOR IP): Performed by: NURSE PRACTITIONER

## 2022-12-15 PROCEDURE — 92526 ORAL FUNCTION THERAPY: CPT

## 2022-12-15 PROCEDURE — 80053 COMPREHEN METABOLIC PANEL: CPT

## 2022-12-15 PROCEDURE — 1200000000 HC SEMI PRIVATE

## 2022-12-15 PROCEDURE — 2580000003 HC RX 258: Performed by: NURSE PRACTITIONER

## 2022-12-15 PROCEDURE — 97129 THER IVNTJ 1ST 15 MIN: CPT

## 2022-12-15 PROCEDURE — 6360000002 HC RX W HCPCS: Performed by: NURSE PRACTITIONER

## 2022-12-15 PROCEDURE — 36415 COLL VENOUS BLD VENIPUNCTURE: CPT

## 2022-12-15 PROCEDURE — 94760 N-INVAS EAR/PLS OXIMETRY 1: CPT

## 2022-12-15 PROCEDURE — 6370000000 HC RX 637 (ALT 250 FOR IP): Performed by: INTERNAL MEDICINE

## 2022-12-15 PROCEDURE — 85025 COMPLETE CBC W/AUTO DIFF WBC: CPT

## 2022-12-15 PROCEDURE — 83735 ASSAY OF MAGNESIUM: CPT

## 2022-12-15 PROCEDURE — 80202 ASSAY OF VANCOMYCIN: CPT

## 2022-12-15 RX ORDER — ONDANSETRON 2 MG/ML
4 INJECTION INTRAMUSCULAR; INTRAVENOUS EVERY 6 HOURS PRN
Status: DISCONTINUED | OUTPATIENT
Start: 2022-12-15 | End: 2022-12-17 | Stop reason: HOSPADM

## 2022-12-15 RX ADMIN — ACETAMINOPHEN 650 MG: 325 TABLET, FILM COATED ORAL at 22:54

## 2022-12-15 RX ADMIN — AZITHROMYCIN DIHYDRATE 500 MG: 500 INJECTION, POWDER, LYOPHILIZED, FOR SOLUTION INTRAVENOUS at 03:21

## 2022-12-15 RX ADMIN — FUROSEMIDE 40 MG: 10 INJECTION, SOLUTION INTRAMUSCULAR; INTRAVENOUS at 08:35

## 2022-12-15 RX ADMIN — FUROSEMIDE 40 MG: 10 INJECTION, SOLUTION INTRAMUSCULAR; INTRAVENOUS at 16:20

## 2022-12-15 RX ADMIN — DICYCLOMINE HYDROCHLORIDE 10 MG: 10 CAPSULE ORAL at 22:53

## 2022-12-15 RX ADMIN — CLOPIDOGREL BISULFATE 75 MG: 75 TABLET ORAL at 08:35

## 2022-12-15 RX ADMIN — TOPIRAMATE 50 MG: 25 TABLET, FILM COATED ORAL at 22:53

## 2022-12-15 RX ADMIN — ATORVASTATIN CALCIUM 40 MG: 40 TABLET, FILM COATED ORAL at 22:53

## 2022-12-15 RX ADMIN — PANTOPRAZOLE SODIUM 40 MG: 40 TABLET, DELAYED RELEASE ORAL at 07:04

## 2022-12-15 RX ADMIN — TRAZODONE HYDROCHLORIDE 100 MG: 100 TABLET ORAL at 22:53

## 2022-12-15 RX ADMIN — HYDRALAZINE HYDROCHLORIDE 25 MG: 25 TABLET, FILM COATED ORAL at 12:50

## 2022-12-15 RX ADMIN — HYDRALAZINE HYDROCHLORIDE 25 MG: 25 TABLET, FILM COATED ORAL at 22:52

## 2022-12-15 RX ADMIN — CEFEPIME 2000 MG: 2 INJECTION, POWDER, FOR SOLUTION INTRAVENOUS at 08:37

## 2022-12-15 RX ADMIN — VANCOMYCIN HYDROCHLORIDE 1250 MG: 1.25 INJECTION, POWDER, LYOPHILIZED, FOR SOLUTION INTRAVENOUS at 05:00

## 2022-12-15 RX ADMIN — HYDRALAZINE HYDROCHLORIDE 25 MG: 25 TABLET, FILM COATED ORAL at 08:35

## 2022-12-15 RX ADMIN — CARVEDILOL 6.25 MG: 6.25 TABLET, FILM COATED ORAL at 16:20

## 2022-12-15 RX ADMIN — SODIUM CHLORIDE, PRESERVATIVE FREE 10 ML: 5 INJECTION INTRAVENOUS at 08:35

## 2022-12-15 RX ADMIN — NIFEDIPINE 30 MG: 30 TABLET, EXTENDED RELEASE ORAL at 08:35

## 2022-12-15 RX ADMIN — LEVOTHYROXINE SODIUM 88 MCG: 0.09 TABLET ORAL at 07:04

## 2022-12-15 RX ADMIN — TOPIRAMATE 25 MG: 25 TABLET, FILM COATED ORAL at 07:04

## 2022-12-15 RX ADMIN — CARVEDILOL 6.25 MG: 6.25 TABLET, FILM COATED ORAL at 08:35

## 2022-12-15 RX ADMIN — SODIUM CHLORIDE, PRESERVATIVE FREE 10 ML: 5 INJECTION INTRAVENOUS at 22:47

## 2022-12-15 RX ADMIN — DICYCLOMINE HYDROCHLORIDE 10 MG: 10 CAPSULE ORAL at 12:50

## 2022-12-15 RX ADMIN — SODIUM CHLORIDE: 9 INJECTION, SOLUTION INTRAVENOUS at 03:13

## 2022-12-15 RX ADMIN — ENOXAPARIN SODIUM 40 MG: 100 INJECTION SUBCUTANEOUS at 08:35

## 2022-12-15 ASSESSMENT — PAIN DESCRIPTION - LOCATION
LOCATION: BACK
LOCATION: ABDOMEN

## 2022-12-15 ASSESSMENT — PAIN SCALES - GENERAL
PAINLEVEL_OUTOF10: 8
PAINLEVEL_OUTOF10: 8

## 2022-12-15 NOTE — PROGRESS NOTES
Physician Progress Note      Neda Argueta  CSN #:                  [id number]  :                       1945  ADMIT DATE:       2022 6:59 PM  [address] [place] DATE:  RESPONDING  PROVIDER #:        Edilia Thomas MD          QUERY TEXT:    Pt admitted with Sepsis . Pt noted to have pneumonia . If possible, please   document in the progress notes and discharge summary if you are evaluating   and/or treating any of the following:    Note: CAP and HCAP indicate where the pneumonia was acquired, not a specific   type. The medical record reflects the following:  Risk Factors: ecf resident, gerd. chronically ill and debilitated  Clinical Indicators: Documentation per h&p of HAP.  wbc 21, presents with very   wet sounding cough in the last 2 to 3 days. COVID and influenza negative. CT   CHEST-Consolidative airspace opacities in the right lung, along with   tree-in-bud  nodular opacities, compatible with multilobar infection. procal-5.08  Treatment: iv Cefepime, vancomycin and azithromycin  Options provided:  -- Treating for a Gram negative pneumonia  -- MRSA pneumonia  -- Other - I will add my own diagnosis  -- Disagree - Not applicable / Not valid  -- Disagree - Clinically unable to determine / Unknown  -- Refer to Clinical Documentation Reviewer    PROVIDER RESPONSE TEXT:    This patient is being treated for a gram negative pneumonia. Query created by:  Norman Haney on 12/15/2022 1:22 PM      Electronically signed by:  Edilia Thomas MD 12/15/2022 6:22 PM

## 2022-12-15 NOTE — PROGRESS NOTES
[facility]   Speech Therapy  Daily Dysphagia Treatment Note        Hero Shabazz  AGE: 76 y.o. GENDER: female  : 1945  [id number]  EPISODE DATE:  2022  Patient Active Problem List   Diagnosis    Sepsis ([address]    HAP (hospital-acquired pneumonia)    Decubital ulcer     Allergies   Allergen Reactions    Demerol Hcl [Meperidine] Palpitations    Morphine Palpitations     Treatment Diagnosis: Dysphagia     Chart review:   History Of Present Illness: The patient is a 76 y.o. female who presents to [facility] with PMHx: Anxiety, CKD stage IV, MDD, CAD, HF, hypothyroid, GERD, PVD, HLD, GERD, hiatal hernia, MI, vertigo, right renal stent, indwelling Gurrola catheter. Patient was sent in from the [facility] with concerns of an abnormal chest x-ray and leukocytosis of 21. Patient had just recently finished antibiotic therapy for sepsis secondary to an advanced decubitus ulcer  Patient also has developed a very wet sounding cough in the last 2 to 3 days     Respiratory Status:   [x]Room Air - 2022              []O2 via nasal cannula              []Other:     Imaging:  CT Chest: 2022  Impression   Consolidative airspace opacities in the right lung, along with tree-in-bud   nodular opacities, compatible with multilobar infection. Lungs/pleura: The central airways are patent. No definite pleural effusion. No pneumothorax is seen. There are consolidative airspace opacities in the   right lung, predominantly in the right upper and lower lobes. Small   tree-in-bud nodular opacities are also noted in the right lung. Modified Barium Swallow Study: MBS completed 2022 per [facility] review. [id number] [facility] STUDY    Patient has clear vocal quality. Patient has intact facial symmetry. Patient has intact lingual and labial ROM, coordination, and strength    Patient has intact oral phase of swallow with adequate labial seal, no anterior spillage.  Patient has adequate bolus manipulation and formation. Patient has adequate mastication of regular textures. Patient has delayed swallow onset with thin liquids as bolus head reaches pyriform sinuses. Patient has deep laryngeal penetration with cup and straw sip of thin liquids. Patient has x1 trace spec of barium remain in laryngeal vestibule post straw sip thin. Patient is then noted to cough. When fluoro re-initiated no aspiration is noted. Patient has incomplete laryngeal vestibule closure. Patient has adequate base of tongue retraction, pharyngeal peristalsis, UES Opening and clearance. SLP recommends level 7 regular textures and level 0 thin liquids        Subjective:     Current diet: easy to chew/thin liquids  Comments regarding tolerating Current Diet: pt reports difficulty with mastication with easy to chew solids, continue to recommend downgrade to soft/bite size dysphagia level 6. Objective:     Pain; none reported at rest, with repositioning pt reports pain in sacral area (wound). Cognitive/Behavior; awake, pleasant, cooperative, reduced insight  Positioning; upright in bed approx 45 degrees (pt found to be eating breakfast reclined further back at approx 35 degrees. Education completed regarding reduced safety with reclined positioning      PO Trials: Thin Liquids; via straw (unable to utilize cup in reclined position), consecutive sips/rapid ingestion and single sips all tolerated with no overt s/s aspiration or changes in vocal quality. Suspect posterior loss of bolus to pharynx, delayed initiation, reduced laryngeal elevation. Nectar thick liquids; via straw (unable to utilize cup in reclined position), consecutive sips/rapid ingestion and single sips all tolerated with no overt s/s aspiration or changes in vocal quality. Suspect posterior loss of bolus to pharynx, delayed initiation, reduced laryngeal elevation.   Honey Thick liquids; DNT  Puree; DNT   Regular food: DNT, pt refused, stated she could not eat toast on meal tray. Dysphagia Tx:   Direct Dysphagia tx: PO trials administered and tolerated as noted above  Dysphagia ex; effortful swallow exercises completed with max cues, reduced effort suspected  Training in compensatory strategies; education regarding positioning and rate reviewed in detail with pt as well as aspiration, aspiration risks and aspiration s/s. Pt response to ex/training: pt expressed understanding, however suspect reduced comprehension and need for ongoing reinforcement    Goals:   Pt will functionally tolerate recommended diet with no overt clinical s/s of aspiration, ongoing continue  Pt will demonstrate understanding of aspiration risk and precautions via education/demonstration with occasional prompting, ongoing continue  Assessment:   Impressions:   OROPHARYNGEAL DYSPHAGIA DIAGNOSIS:   Pt awake, pleasant, cooperative and accepting of assessment. Pt seen bedside with poor positioning d/t decub ulcer. Upright in bed, approx 45 degrees for all PO trials with SLP. Pt had been positioned in more reclined position upon SLP arrival and stated she ate breakfast that way. Mild-mod oral dysphagia and mild pharyngeal dysphagia suspected. Pt fully edentulous and reports difficulty with mastication of solids. Suspect premature loss of bolus to pharynx, delayed initiation and reduced elevation. Pt declined easy to chew solids, however on 12/14 demonstrated reduced bolus cohesion, and delayed A-P transit. NO s/s aspiration or changes in vocal quality with any PO trials of thin and nectar liquids on this date. Pt noted to have audible breathing upon SLP arrival and wet cough prior to any PO trials and throughout session. Recommend downgrade to soft/bite size solids and continue with thin liquids with pt positioned as upright as possible with all PO. Monitor closely for any s/s aspiration with PO intake and any changes in respiratory status. If any issues arise, recommend MBS. Recommendations given to Dr. Ni Ng via [facility]. SLP to follow for dysphagia tx/diet tolerance. Diet Recommendations:  Solids: soft/bite size  Liquids: thin, slow rate  Medications: crushed in puree  Strategies: Upright as possible with all PO intake , Small bites/sips , Eat/feed slowly, Remain upright 30-45 min     Education:  Provided education regarding role of SLP, results of assessment, recommendations and general speech pathology plan of care. [x] Pt verbalized understanding and agreement   [x] Pt requires ongoing learning   [] No evidence of comprehension     Prognosis for dysphagia: fair  Barriers: poor positioning, medical complications, reduced insight    Plan:     Continue Dysphagia Therapy: YES  Interventions: dysphagia tx, education  Duration/Frequency of therapy while on unit: 3-5x/week  Discharge Instructions:   Anticipate Yes_x___No__ for further skilled Speech Therapy for Dysphagia at discharge    This note serves as a D/C Summary in the event that this patient is discharged prior to the next therapy session.     Coded treatment time: 10  Total treatment time: 24    Time in: 4629  Time out: [facility]    Electronically signed by   Kleber Hilton MA CCC-SLP #[id number]  Speech-Language Pathologist  12/15/22  8:56am

## 2022-12-15 NOTE — PLAN OF CARE
Problem: Discharge Planning  Goal: Discharge to home or other facility with appropriate resources  Outcome: Progressing     Problem: Pain  Goal: Verbalizes/displays adequate comfort level or baseline comfort level  Outcome: Progressing     Problem: Nutrition Deficit:  Goal: Optimize nutritional status  Outcome: Progressing     Problem: Cardiovascular - Adult  Goal: Maintains optimal cardiac output and hemodynamic stability  Outcome: Progressing  Goal: Absence of cardiac dysrhythmias or at baseline  Outcome: Progressing     Problem: Metabolic/Fluid and Electrolytes - Adult  Goal: Electrolytes maintained within normal limits  Outcome: Progressing  Goal: Hemodynamic stability and optimal renal function maintained  Outcome: Progressing     Problem: Hematologic - Adult  Goal: Maintains hematologic stability  Outcome: Progressing     Problem: Skin/Tissue Integrity  Goal: Absence of new skin breakdown  Description: 1. Monitor for areas of redness and/or skin breakdown  2. Assess vascular access sites hourly  3. Every 4-6 hours minimum:  Change oxygen saturation probe site  4. Every 4-6 hours:  If on nasal continuous positive airway pressure, respiratory therapy assess nares and determine need for appliance change or resting period.   Outcome: Progressing

## 2022-12-15 NOTE — PROGRESS NOTES
Hospitalist Progress Note      PCP: Andrea Jasmine    Date of Admission: 12/13/2022    Subjective: refusing to work with therapy    Medications:  Reviewed    Infusion Medications    sodium chloride 5 mL/hr at 12/15/22 0313     Scheduled Medications    vancomycin  1,250 mg IntraVENous Q24H    vancomycin (VANCOCIN) intermittent dosing (placeholder)   Other RX Placeholder    azithromycin  500 mg IntraVENous Q24H    cefepime  2,000 mg IntraVENous Q12H    atorvastatin  40 mg Oral QHS    carvedilol  6.25 mg Oral BID WC    clopidogrel  75 mg Oral Daily    hydrALAZINE  25 mg Oral TID    levothyroxine  88 mcg Oral Daily    NIFEdipine  30 mg Oral Daily    pantoprazole  40 mg Oral QAM AC    topiramate  50 mg Oral Nightly    topiramate  25 mg Oral QAM AC    traZODone  100 mg Oral Nightly    furosemide  40 mg IntraVENous BID    sodium chloride flush  5-40 mL IntraVENous 2 times per day    enoxaparin  40 mg SubCUTAneous Daily     PRN Meds: ondansetron, dicyclomine, hydrOXYzine HCl, ipratropium-albuterol, sodium chloride flush, sodium chloride, acetaminophen **OR** acetaminophen      Intake/Output Summary (Last 24 hours) at 12/15/2022 1705  Last data filed at 12/15/2022 1654  Gross per 24 hour   Intake --   Output 2800 ml   Net -2800 ml       Physical Exam Performed:    BP (!) 112/57   Pulse 84   Temp 99 °F (37.2 °C) (Oral)   Resp 16   Ht 5' 4\" (1.626 m)   Wt 153 lb 7 oz (69.6 kg)   SpO2 90%   BMI 26.34 kg/m²        General appearance: NAD  Lungs: Respirations are easy regular nonlabored, coarse Rales throughout, congested wet nonproductive cough. .  Heart: Regular rate and rhythm with Normal S1/S2 without murmurs, rubs or gallops, point of maximum impulse non-displaced  Abdomen: Soft, non-tender or non-distended without rigidity or guarding and positive bowel sounds all four quadrants.   Extremities: Poor muscle tone lower extremities, foot drop  Skin: Pale, multiple areas of ecchymosis and bruising to the lower extremities, advanced decubitus sacral ulcer. Neurologic: Alert and oriented X 3, no focal motor or sensory deficit  Mental status: Alert, oriented, thought content appropriate. Capillary Refill: Acceptable  < 3 seconds  Peripheral Pulses: +3 Easily felt, not easily obliterated with pressure       Labs:   Recent Labs     12/13/22 1936 12/14/22 0718 12/15/22  0448   WBC 18.9* 15.9* 13.1*   HGB 10.5* 9.6* 9.9*   HCT 33.2* 30.4* 31.0*    151 166     Recent Labs     12/13/22 1936 12/14/22 0718 12/15/22  0448    139 135*   K 4.2 4.0 3.5    104 101   CO2 22 20* 20*   BUN 47* 42* 38*   CREATININE 0.9 0.8 0.8   CALCIUM 8.8 8.9 8.1*     Recent Labs     12/13/22 1936 12/14/22 0718 12/15/22  0448   * 59* 56*   * 110* 92*   BILITOT 0.5 0.5 0.7   ALKPHOS 141* 115 109     No results for input(s): INR in the last 72 hours. No results for input(s): Ama Gregory in the last 72 hours. Urinalysis:      Lab Results   Component Value Date/Time    NITRU Negative 12/14/2022 10:11 AM    WBCUA 873 12/14/2022 10:11 AM    BACTERIA 4+ 12/14/2022 10:11 AM    RBCUA 50 12/14/2022 10:11 AM    BLOODU MODERATE 12/14/2022 10:11 AM    SPECGRAV 1.013 12/14/2022 10:11 AM    GLUCOSEU Negative 12/14/2022 10:11 AM       Radiology:  CT CHEST WO CONTRAST   Final Result   Consolidative airspace opacities in the right lung, along with tree-in-bud   nodular opacities, compatible with multilobar infection. RECOMMENDATIONS:   Follow-up radiographs recommended to ensure resolution.              IP CONSULT TO HOSPITALIST  IP CONSULT TO PHARMACY  IP CONSULT TO UROLOGY  IP CONSULT TO SOCIAL WORK  IP CONSULT TO PALLIATIVE CARE    Assessment/Plan:    Active Hospital Problems    Diagnosis     Sepsis ([facility] [address] [A41.9]      Priority: Medium    HAP (hospital-acquired pneumonia) [J18.9, Y95]      Priority: Medium    Decubital ulcer [L89.90]      Priority: Medium         HAP: Evidenced on CT right upper lobe, right lower lobe  Cefepime, vancomycin and azithromycin  MRSA nasal, Legionella, strep pneumoniae  Sputum culture sent  COVID and influenza negative  Oxygen therapy to maintain saturation greater than 90%, patient currently on room air saturating 92 to 94%  Leucocytosis improved     Sepsis: Questionable source pneumonia, decubitus, CAUTI  Broad-spectrum antibiotic: as discussed above  WBC 18.9, lactic acid 3.8-> 1.5->1.0  Afebrile, no tachycardia, no hypotension  CRP: 226  UA to be collected after simpson changed  BC x2 in process     Chronic heart failure: BNP 38,570, adventitious breath sounds  Lasix 40 mg IVP twice daily  Daily weight  Strict intake and output     Decubitus ulcer: Chronic, wound care consulted        CAD HTN, HLD: Continue statin, carvedilol, hydralazine, nifedipine        DVT Prophylaxis: Lovenox  Diet: ADULT ORAL NUTRITION SUPPLEMENT; Breakfast, Dinner; Standard High Calorie/High Protein Oral Supplement  ADULT ORAL NUTRITION SUPPLEMENT; Lunch; Wound Healing Oral Supplement  ADULT DIET; Dysphagia - Soft and Bite Sized; 4 carb choices (60 gm/meal); Low Fat/Low Chol/High Fiber/BOGDAN  Code Status: Full Code  PT/OT Eval Status: ordered, but pt not willing    Dispo - cont care.  Pt refusing to work with therapy, palliative care consulted    Sukhi Rajan MD

## 2022-12-16 LAB
A/G RATIO: 1.1 (ref 1.1–2.2)
ALBUMIN SERPL-MCNC: 2.1 G/DL (ref 3.4–5)
ALP BLD-CCNC: 122 U/L (ref 40–129)
ALT SERPL-CCNC: 68 U/L (ref 10–40)
ANION GAP SERPL CALCULATED.3IONS-SCNC: 16 MMOL/L (ref 3–16)
AST SERPL-CCNC: 41 U/L (ref 15–37)
BASOPHILS ABSOLUTE: 0 K/UL (ref 0–0.2)
BASOPHILS RELATIVE PERCENT: 0.1 %
BILIRUB SERPL-MCNC: 1 MG/DL (ref 0–1)
BUN BLDV-MCNC: 39 MG/DL (ref 7–20)
CALCIUM SERPL-MCNC: 8.2 MG/DL (ref 8.3–10.6)
CHLORIDE BLD-SCNC: 102 MMOL/L (ref 99–110)
CO2: 20 MMOL/L (ref 21–32)
CREAT SERPL-MCNC: 0.7 MG/DL (ref 0.6–1.2)
EOSINOPHILS ABSOLUTE: 0.1 K/UL (ref 0–0.6)
EOSINOPHILS RELATIVE PERCENT: 0.5 %
GFR SERPL CREATININE-BSD FRML MDRD: >60 ML/MIN/{1.73_M2}
GLUCOSE BLD-MCNC: 101 MG/DL (ref 70–99)
GLUCOSE BLD-MCNC: 62 MG/DL (ref 70–99)
HCT VFR BLD CALC: 31.3 % (ref 36–48)
HEMOGLOBIN: 10.4 G/DL (ref 12–16)
LYMPHOCYTES ABSOLUTE: 1.8 K/UL (ref 1–5.1)
LYMPHOCYTES RELATIVE PERCENT: 17.9 %
MAGNESIUM: 1.8 MG/DL (ref 1.8–2.4)
MCH RBC QN AUTO: 28.1 PG (ref 26–34)
MCHC RBC AUTO-ENTMCNC: 33.1 G/DL (ref 31–36)
MCV RBC AUTO: 84.9 FL (ref 80–100)
MONOCYTES ABSOLUTE: 0.5 K/UL (ref 0–1.3)
MONOCYTES RELATIVE PERCENT: 4.6 %
NEUTROPHILS ABSOLUTE: 7.9 K/UL (ref 1.7–7.7)
NEUTROPHILS RELATIVE PERCENT: 76.9 %
PDW BLD-RTO: 16.1 % (ref 12.4–15.4)
PERFORMED ON: ABNORMAL
PLATELET # BLD: 172 K/UL (ref 135–450)
PMV BLD AUTO: 7.5 FL (ref 5–10.5)
POTASSIUM REFLEX MAGNESIUM: 2.8 MMOL/L (ref 3.5–5.1)
PROCALCITONIN: 2.41 NG/ML (ref 0–0.15)
RBC # BLD: 3.69 M/UL (ref 4–5.2)
SODIUM BLD-SCNC: 138 MMOL/L (ref 136–145)
TOTAL PROTEIN: 4.1 G/DL (ref 6.4–8.2)
VANCOMYCIN RANDOM: 18.7 UG/ML
WBC # BLD: 10.3 K/UL (ref 4–11)

## 2022-12-16 PROCEDURE — 2580000003 HC RX 258: Performed by: NURSE PRACTITIONER

## 2022-12-16 PROCEDURE — 6370000000 HC RX 637 (ALT 250 FOR IP): Performed by: INTERNAL MEDICINE

## 2022-12-16 PROCEDURE — 6370000000 HC RX 637 (ALT 250 FOR IP): Performed by: NURSE PRACTITIONER

## 2022-12-16 PROCEDURE — 84145 PROCALCITONIN (PCT): CPT

## 2022-12-16 PROCEDURE — 6360000002 HC RX W HCPCS: Performed by: NURSE PRACTITIONER

## 2022-12-16 PROCEDURE — 36415 COLL VENOUS BLD VENIPUNCTURE: CPT

## 2022-12-16 PROCEDURE — 80053 COMPREHEN METABOLIC PANEL: CPT

## 2022-12-16 PROCEDURE — 85025 COMPLETE CBC W/AUTO DIFF WBC: CPT

## 2022-12-16 PROCEDURE — 1200000000 HC SEMI PRIVATE

## 2022-12-16 PROCEDURE — 92526 ORAL FUNCTION THERAPY: CPT

## 2022-12-16 PROCEDURE — 83735 ASSAY OF MAGNESIUM: CPT

## 2022-12-16 RX ORDER — FUROSEMIDE 40 MG/1
40 TABLET ORAL 2 TIMES DAILY
Status: DISCONTINUED | OUTPATIENT
Start: 2022-12-16 | End: 2022-12-17 | Stop reason: HOSPADM

## 2022-12-16 RX ORDER — HYDROCODONE BITARTRATE AND ACETAMINOPHEN 5; 325 MG/1; MG/1
1 TABLET ORAL EVERY 8 HOURS PRN
Status: DISCONTINUED | OUTPATIENT
Start: 2022-12-16 | End: 2022-12-16 | Stop reason: SDUPTHER

## 2022-12-16 RX ORDER — HYDROCODONE BITARTRATE AND ACETAMINOPHEN 7.5; 325 MG/1; MG/1
1 TABLET ORAL EVERY 6 HOURS PRN
Status: DISCONTINUED | OUTPATIENT
Start: 2022-12-16 | End: 2022-12-17 | Stop reason: HOSPADM

## 2022-12-16 RX ORDER — LEVOFLOXACIN 500 MG/1
500 TABLET, FILM COATED ORAL DAILY
Status: DISCONTINUED | OUTPATIENT
Start: 2022-12-16 | End: 2022-12-17 | Stop reason: HOSPADM

## 2022-12-16 RX ADMIN — ATORVASTATIN CALCIUM 40 MG: 40 TABLET, FILM COATED ORAL at 20:32

## 2022-12-16 RX ADMIN — LEVOFLOXACIN 500 MG: 500 TABLET, FILM COATED ORAL at 11:56

## 2022-12-16 RX ADMIN — LEVOTHYROXINE SODIUM 88 MCG: 0.09 TABLET ORAL at 06:30

## 2022-12-16 RX ADMIN — CLOPIDOGREL BISULFATE 75 MG: 75 TABLET ORAL at 08:21

## 2022-12-16 RX ADMIN — NIFEDIPINE 30 MG: 30 TABLET, EXTENDED RELEASE ORAL at 08:21

## 2022-12-16 RX ADMIN — HYDRALAZINE HYDROCHLORIDE 25 MG: 25 TABLET, FILM COATED ORAL at 14:46

## 2022-12-16 RX ADMIN — CARVEDILOL 6.25 MG: 6.25 TABLET, FILM COATED ORAL at 16:40

## 2022-12-16 RX ADMIN — TRAZODONE HYDROCHLORIDE 100 MG: 100 TABLET ORAL at 20:32

## 2022-12-16 RX ADMIN — ACETAMINOPHEN 650 MG: 325 TABLET, FILM COATED ORAL at 08:21

## 2022-12-16 RX ADMIN — HYDROCODONE BITARTRATE AND ACETAMINOPHEN 1 TABLET: 7.5; 325 TABLET ORAL at 11:57

## 2022-12-16 RX ADMIN — PANTOPRAZOLE SODIUM 40 MG: 40 TABLET, DELAYED RELEASE ORAL at 06:30

## 2022-12-16 RX ADMIN — AZITHROMYCIN DIHYDRATE 500 MG: 500 INJECTION, POWDER, LYOPHILIZED, FOR SOLUTION INTRAVENOUS at 05:57

## 2022-12-16 RX ADMIN — TOPIRAMATE 50 MG: 25 TABLET, FILM COATED ORAL at 20:32

## 2022-12-16 RX ADMIN — CARVEDILOL 6.25 MG: 6.25 TABLET, FILM COATED ORAL at 08:21

## 2022-12-16 RX ADMIN — SODIUM CHLORIDE: 9 INJECTION, SOLUTION INTRAVENOUS at 01:47

## 2022-12-16 RX ADMIN — HYDROCODONE BITARTRATE AND ACETAMINOPHEN 1 TABLET: 7.5; 325 TABLET ORAL at 16:40

## 2022-12-16 RX ADMIN — FUROSEMIDE 40 MG: 40 TABLET ORAL at 16:40

## 2022-12-16 RX ADMIN — TOPIRAMATE 25 MG: 25 TABLET, FILM COATED ORAL at 06:30

## 2022-12-16 RX ADMIN — HYDRALAZINE HYDROCHLORIDE 25 MG: 25 TABLET, FILM COATED ORAL at 08:20

## 2022-12-16 RX ADMIN — FUROSEMIDE 40 MG: 10 INJECTION, SOLUTION INTRAMUSCULAR; INTRAVENOUS at 08:21

## 2022-12-16 RX ADMIN — POTASSIUM BICARBONATE 40 MEQ: 782 TABLET, EFFERVESCENT ORAL at 11:56

## 2022-12-16 RX ADMIN — CEFEPIME 2000 MG: 2 INJECTION, POWDER, FOR SOLUTION INTRAVENOUS at 01:51

## 2022-12-16 RX ADMIN — ENOXAPARIN SODIUM 40 MG: 100 INJECTION SUBCUTANEOUS at 08:21

## 2022-12-16 RX ADMIN — SODIUM CHLORIDE, PRESERVATIVE FREE 10 ML: 5 INJECTION INTRAVENOUS at 20:31

## 2022-12-16 RX ADMIN — HYDRALAZINE HYDROCHLORIDE 25 MG: 25 TABLET, FILM COATED ORAL at 20:32

## 2022-12-16 ASSESSMENT — PAIN DESCRIPTION - LOCATION
LOCATION: BUTTOCKS
LOCATION: BACK

## 2022-12-16 ASSESSMENT — PAIN DESCRIPTION - ORIENTATION: ORIENTATION: MID

## 2022-12-16 ASSESSMENT — PAIN - FUNCTIONAL ASSESSMENT: PAIN_FUNCTIONAL_ASSESSMENT: PREVENTS OR INTERFERES SOME ACTIVE ACTIVITIES AND ADLS

## 2022-12-16 ASSESSMENT — PAIN SCALES - GENERAL
PAINLEVEL_OUTOF10: 7
PAINLEVEL_OUTOF10: 6

## 2022-12-16 ASSESSMENT — ENCOUNTER SYMPTOMS
COUGH: 1
SHORTNESS OF BREATH: 1
GASTROINTESTINAL NEGATIVE: 1
CHEST TIGHTNESS: 0

## 2022-12-16 ASSESSMENT — PAIN DESCRIPTION - DESCRIPTORS: DESCRIPTORS: ACHING

## 2022-12-16 NOTE — CARE COORDINATION
12/16/22 1411   IMM Letter   IMM Letter given to Patient/Family/Significant other/Guardian/POA/by: Provided to patient at bedside by RAFY Dickson, BRISEYDA. Education provided to patient, patient reported no questions and verbalized understanding.  Patient aware of 4 hours allotted time to determine if they choose to pursue Medicare appeal process   IMM Letter date given: 12/16/22   IMM Letter time given: [address] [facility] RAFY Cano  [phone]  Electronically signed by Gege Johnston on 12/16/2022 at 2:11 PM

## 2022-12-16 NOTE — PROGRESS NOTES
[facility]   Speech Therapy  Daily Dysphagia Treatment Note        Annette Griffith  AGE: 76 y.o. GENDER: female  : 1945  [id number]  EPISODE DATE:  2022  Patient Active Problem List   Diagnosis    Sepsis ([address]    HAP (hospital-acquired pneumonia)    Decubital ulcer     Allergies   Allergen Reactions    Demerol Hcl [Meperidine] Palpitations    Morphine Palpitations     Treatment Diagnosis: Dysphagia     Chart review:   History Of Present Illness: The patient is a 76 y.o. female who presents to [facility] with PMHx: Anxiety, CKD stage IV, MDD, CAD, HF, hypothyroid, GERD, PVD, HLD, GERD, hiatal hernia, MI, vertigo, right renal stent, indwelling Gurrola catheter. Patient was sent in from the [facility] with concerns of an abnormal chest x-ray and leukocytosis of 21. Patient had just recently finished antibiotic therapy for sepsis secondary to an advanced decubitus ulcer  Patient also has developed a very wet sounding cough in the last 2 to 3 days     Respiratory Status:   [x]Room Air - 2022              []O2 via nasal cannula              []Other:     Imaging:  CT Chest: 2022  Impression   Consolidative airspace opacities in the right lung, along with tree-in-bud   nodular opacities, compatible with multilobar infection. Lungs/pleura: The central airways are patent. No definite pleural effusion. No pneumothorax is seen. There are consolidative airspace opacities in the   right lung, predominantly in the right upper and lower lobes. Small   tree-in-bud nodular opacities are also noted in the right lung. Modified Barium Swallow Study: MBS completed 2022 per [facility] review. [id number] [facility] STUDY    Patient has clear vocal quality. Patient has intact facial symmetry. Patient has intact lingual and labial ROM, coordination, and strength    Patient has intact oral phase of swallow with adequate labial seal, no anterior spillage.  Patient has adequate bolus manipulation and formation. Patient has adequate mastication of regular textures. Patient has delayed swallow onset with thin liquids as bolus head reaches pyriform sinuses. Patient has deep laryngeal penetration with cup and straw sip of thin liquids. Patient has x1 trace spec of barium remain in laryngeal vestibule post straw sip thin. Patient is then noted to cough. When fluoro re-initiated no aspiration is noted. Patient has incomplete laryngeal vestibule closure. Patient has adequate base of tongue retraction, pharyngeal peristalsis, UES Opening and clearance. SLP recommends level 7 regular textures and level 0 thin liquids        Subjective:     Current diet: easy to chew/thin liquids  Comments regarding tolerating Current Diet: pt reports good tolerance    Objective:     Pain; none reported   Cognitive/Behavior; awake, pleasant, cooperative, reduced insight  Positioning; upright in bed approx 85 degrees, reviewed education regarding upright positioning for all PO intake. PO Trials: Thin Liquids; via straw, consecutive sips/rapid ingestion and single sips all tolerated with no overt s/s aspiration or changes in vocal quality. Suspect posterior loss of bolus to pharynx, delayed initiation, reduced laryngeal elevation. Nectar thick liquids; DNT  Honey Thick liquids; DNT  Puree; adequate oral clearance delayed initiation, reduced laryngeal elevation, no overt s/s aspiration. Regular food: DNT    Dysphagia Tx:   Direct Dysphagia tx: PO trials administered and tolerated as noted above  Dysphagia ex; n/a on this date  Training in compensatory strategies; education regarding positioning and rate reviewed in detail with pt and friend at bedside who was assisting pt with lunch meal, as well as aspiration, aspiration risks and aspiration s/s.   Pt response to ex/training: pt expressed understanding, however suspect reduced comprehension and need for ongoing reinforcement    Goals:   Pt will functionally tolerate recommended diet with no overt clinical s/s of aspiration, ongoing continue  Pt will demonstrate understanding of aspiration risk and precautions via education/demonstration with occasional prompting, ongoing continue  Assessment:   Impressions:   OROPHARYNGEAL DYSPHAGIA DIAGNOSIS:   Pt awake, pleasant, cooperative and accepting of tx. Pt seen bedside with much improved positioning today at approx 85 degrees. Mild-mod oral dysphagia and mild pharyngeal dysphagia suspected. Pt fully edentulous and reports difficulty with mastication of solids. Suspect premature loss of bolus to pharynx, delayed initiation and reduced elevation. Pt declined easy to chew solids, however on 12/14 demonstrated reduced bolus cohesion, and delayed A-P transit. NO s/s aspiration or changes in vocal quality with any PO trials of thin and puree on this date. Pt noted to have audible breathing throughout session. Recommend continue to soft/bite size solids and continue with thin liquids with pt positioned as upright as possible with all PO. Medications in puree. Monitor closely for any s/s aspiration with PO intake and any changes in respiratory status. If any issues arise, recommend MBS. Recommendations reviewed with Dr. Ramon Madrigal at bedside. SLP to follow for dysphagia tx/diet tolerance. Diet Recommendations:  Solids: soft/bite size  Liquids: thin, slow rate  Medications: crushed in puree, whole in puree  Strategies: Upright as possible with all PO intake , Small bites/sips , Eat/feed slowly, Remain upright 30-45 min     Education:  Provided education regarding role of SLP, results of assessment, recommendations and general speech pathology plan of care.    [x] Pt verbalized understanding and agreement   [x] Pt requires ongoing learning   [] No evidence of comprehension     Prognosis for dysphagia: fair  Barriers: poor positioning, medical complications, reduced insight    Plan:     Continue Dysphagia Therapy: YES  Interventions: dysphagia tx, education  Duration/Frequency of therapy while on unit: 3-5x/week  Discharge Instructions:   Anticipate Yes_x___No__ for further skilled Speech Therapy for Dysphagia at discharge    This note serves as a D/C Summary in the event that this patient is discharged prior to the next therapy session.     Coded treatment time: 0  Total treatment time: 11    Time in: 0390  Time out: 1231    Electronically signed by   Rory Tubbs  [address]  Speech-Language Pathologist  12/16/22  12:31pm

## 2022-12-16 NOTE — CONSULTS
PALLIATIVE MEDICINE CONSULTATION     Patient name:Marcela Paniagua Payor   SNQ:[id number]    :1945  Room/Bed:T5D-5240/4123-01   LOS: 3 days         Date of consult:2022    Consult Information  Palliative Medicine Consult performed by: WINDY Espinoza CNP     Inpatient consult to Palliative Care  Consult performed by: WINDY Espinoza CNP  Consult ordered by: Fior Jang MD  Reason for consult: GOC, code status      ASSESSMENT/RECOMMENDATIONS     76 y.o. female with shortness of breath, poor appetite, sacral pain, debility, and a nonhealing wound. Symptom Management:  Shortness of breath -secondary to pneumonia and congestive heart failure, IV antibiotics and diuretics per attending. Unfortunately, the patient is not able to sit up long enough to do deep breathing exercises, will resume home pain management, Norco every 6 hours. Sacral wound - Per chart review, it appears the patient is gone from a stage II ulcer to a stage IV within a matter of 2 months. Albumin is extremely low at 2.1 which does not promote wound healing. Currently on a nutritional supplement. Malnutrition -secondary to poor appetite. Patient has been unable to sit up long enough to eat a meal due to pain. We will resume pain medication. Patient declined need for an appetite stimulant at this time. If pain is well controlled, and she continues not to eat, then a stimulant would be appropriate. Sacral pain -previously taking Norco, 5-325 mg every 6 hours at [facility]. Will increase to 7.5-325 mg, patient previously did not receive full relief. Debility -patient is declining PT and OT, family states this is her baseline. Expressed goals need to align with actions. Goals of Care -see below    Patient/Family Goals of Care :    Conversation had with patient, sister-in-law, and granddaughter.  I explained the highly personal nature of deciding how to approach serious illness, and outlined two extremes--continuing disease-focused, morbidity-inducing treatment with the possibility of prolonging life vs. focusing on comfort/quality of life while allowing disease progression and natural death. Emphasis was placed on the absence of a \"right\" answer, as opposed to making good decisions based on personal preferences and circumstances, with ongoing reevaluation and adjustment in treatment goals as the clinical course unfolds. Patient's granddaughter, who is very active in her care, understands that the patient's actions are not promoting improved health. Wound is continuing to get worse, they have had rut conversations with wound care regarding progression. Appetite is very poor, patient's movement is minimal.  We discussed CODE STATUS at length, will change code to DNR arrest.  Given patient's actions and prognosis, family would like to meet with hospice after acute medical needs are resolved. Of note, patient's granddaughter, GURPREET, was on the phone throughout the conversation. Patient essentially referred to her for decision-making, but did verbalize that she does not wish to pursue resuscitative measures in the event of cardiac or respiratory arrest.    Disposition/Discharge Plan:   Pending, [facility]     Advance Directives:  Surrogate Decision Maker: Carmel Thompson. Unfortunately patient's [facility]. POA is undergoing treatment for cancer, therefore has not been as active in her care. Granddaughter, GURPREET, is currently patient's . Patient did confirm as well in the room. Code status:  DNR-CCA    Case discussed with: patient, floor RN, family, and attending. Thank you for allowing us to participate in the care of this patient. HISTORY     CC: Blood infection, sore. HPI: The patient is a 76 y.o. female with a past medical history significant for anxiety, depression, hiatal hernia, hyperlipidemia, hypertension, MI, thyroid disease, and vertigo.   Presented to the ED via squad for evaluation of shortness of breath and abnormal lab work. Admitted for treatment of pneumonia. Patient has been working with wound care for quite some time, however wound continues to progress. They have educated the family that it is close to protruding into the bone. Family seems to have a good understanding regarding patient's overall prognosis. Palliative Medicine SymptomScreening/ROS:    Review of Systems   Constitutional:  Positive for appetite change and fatigue. Respiratory:  Positive for cough and shortness of breath. Negative for chest tightness. Cardiovascular:  Negative for chest pain. Gastrointestinal: Negative. Musculoskeletal:  Positive for myalgias. Skin:  Positive for wound. Neurological:  Positive for weakness and light-headedness. Negative for dizziness. Psychiatric/Behavioral:  The patient is nervous/anxious. A complete 10 count ROS was obtained. Pertinent positives mentioned above in HPI/ROS. All others if not mentioned are negative. Palliative Performance Scale:     [] 60%  Amb reduced; Sig dz. Can't do hobbies/housework; Intake normal or reduced, Occasional assist; LOC full/confusion   [] 50%  Mainly sit/lie; Extensive disease. Mainly assist, Intake normal or reduced; Occasional assist; LOC full/confusion   [] 40%  Mainly in bed; Extensive disease; Mainly assist; Intake normal or reduced; Occasional assist; LOC full/confusion   [x] 30%  Bed bound, Extensive disease; Total care; Intake reduced; LOC full/confusion   [] 20%  Bed bound; Extensive disease; Total care; Intake minimal; Drowsy/coma   [] 10%  Bed bound; Extensive disease;  Total care; Mouth care only; Drowsy/coma   []  0%   Death       Home med list and hospital medications reviewed in chart as of 12/16/2022     EXAM     Vitals:    12/16/22 0756   BP: (!) 117/58   Pulse: 81   Resp: 16   Temp: 97.7 °F (36.5 °C)   SpO2: 93%       Physical Exam  Cardiovascular:      Rate and Rhythm: Normal rate.   Pulmonary:      Effort: Pulmonary effort is normal.   Abdominal:      Palpations: Abdomen is soft. Skin:     Comments: Sacral wound, unable to assess. Neurological:      Mental Status: She is alert and oriented to person, place, and time. Motor: Weakness present. Psychiatric:         Mood and Affect: Mood normal.         Thought Content:  Thought content normal.         Judgment: Judgment normal.          Current labs in the epic chart reviewed as of 12/16/2022   Review of previous notes, admits, labs, radiology and testing relevant to this consult done in this chart today 12/16/2022      Total time: 72 minutes  >50% of time spent counseling patient at bedside or POA/family member if applicable , reviewing information and discussing care, coordinating with care team  Signed By: Electronically signed by WINDY Diaz CNP on 12/16/2022 at 9:09 AM  Palliative Medicine   [phone]    December 16, 2022

## 2022-12-16 NOTE — PROGRESS NOTES
Clinical Pharmacy Note  Vancomycin Consult    Davi Ulrich is a 76 y.o. female ordered Vancomycin for HAP; consult received from [facility] to manage therapy. Also receiving cefepime. Allergies:  Demerol hcl [meperidine] and Morphine     Temp max:  Temp (24hrs), Av.5 °F (36.9 °C), Min:97.9 °F (36.6 °C), Max:99 °F (37.2 °C)      Recent Labs     2218 12/15/22  0448   WBC 18.9* 15.9* 13.1*       Recent Labs     2218 12/15/22  0448   BUN 47* 42* 38*   CREATININE 0.9 0.8 0.8         Intake/Output Summary (Last 24 hours) at 2022 0055  Last data filed at 12/15/2022 1654  Gross per 24 hour   Intake --   Output 1375 ml   Net -1375 ml       Culture Results:      Ht Readings from Last 1 Encounters:   22 5' 4\" (1.626 m)        Wt Readings from Last 1 Encounters:   12/15/22 153 lb 7 oz (69.6 kg)         Estimated Creatinine Clearance: 55 mL/min (based on SCr of 0.8 mg/dL). Assessment:  Day # 3 of vancomycin. Current regimen: 1250 mg every 24 hours  Vancomycin level: 18.7 mg/L  Predicted AUC: 625    Plan:  Change regimen to 1000 mg every 24 hours. New predicted AUC: 505    Thank you for the consult.    Angelina Duran, PiloD

## 2022-12-16 NOTE — PROGRESS NOTES
Hospitalist Progress Note      PCP: Reno Snyder    Date of Admission: 12/13/2022    Subjective: sitting up and tolerating diet, needs meds crushed  for PO    Medications:  Reviewed    Infusion Medications    sodium chloride 5 mL/hr at 12/16/22 0147     Scheduled Medications    potassium bicarb-citric acid  40 mEq Oral Daily    furosemide  40 mg Oral BID    levoFLOXacin  500 mg Oral Daily    atorvastatin  40 mg Oral QHS    carvedilol  6.25 mg Oral BID WC    clopidogrel  75 mg Oral Daily    hydrALAZINE  25 mg Oral TID    levothyroxine  88 mcg Oral Daily    NIFEdipine  30 mg Oral Daily    pantoprazole  40 mg Oral QAM AC    topiramate  50 mg Oral Nightly    topiramate  25 mg Oral QAM AC    traZODone  100 mg Oral Nightly    sodium chloride flush  5-40 mL IntraVENous 2 times per day    enoxaparin  40 mg SubCUTAneous Daily     PRN Meds: HYDROcodone-acetaminophen, ondansetron, dicyclomine, hydrOXYzine HCl, ipratropium-albuterol, sodium chloride flush, sodium chloride, acetaminophen **OR** acetaminophen      Intake/Output Summary (Last 24 hours) at 12/16/2022 1750  Last data filed at 12/16/2022 0137  Gross per 24 hour   Intake --   Output 650 ml   Net -650 ml       Physical Exam Performed:    /64   Pulse 70   Temp 97.9 °F (36.6 °C) (Oral)   Resp 16   Ht 5' 4\" (1.626 m)   Wt 152 lb 8.9 oz (69.2 kg)   SpO2 95%   BMI 26.19 kg/m²        General appearance: NAD  Lungs: Respirations are easy regular nonlabored, coarse Rales throughout, congested wet nonproductive cough. .  Heart: Regular rate and rhythm with Normal S1/S2 without murmurs, rubs or gallops, point of maximum impulse non-displaced  Abdomen: Soft, non-tender or non-distended without rigidity or guarding and positive bowel sounds all four quadrants. Extremities: Poor muscle tone lower extremities, foot drop  Skin: Pale, multiple areas of ecchymosis and bruising to the lower extremities, advanced decubitus sacral ulcer.   Neurologic: Alert and oriented X 3, no focal motor or sensory deficit  Mental status: Alert, oriented, thought content appropriate. Capillary Refill: Acceptable  < 3 seconds  Peripheral Pulses: +3 Easily felt, not easily obliterated with pressure       Labs:   Recent Labs     12/14/22  0718 12/15/22  0448 12/16/22  0539   WBC 15.9* 13.1* 10.3   HGB 9.6* 9.9* 10.4*   HCT 30.4* 31.0* 31.3*    166 172     Recent Labs     12/14/22  0718 12/15/22  0448 12/16/22  0539    135* 138   K 4.0 3.5 2.8*    101 102   CO2 20* 20* 20*   BUN 42* 38* 39*   CREATININE 0.8 0.8 0.7   CALCIUM 8.9 8.1* 8.2*     Recent Labs     12/14/22 0718 12/15/22  0448 12/16/22  0539   AST 59* 56* 41*   * 92* 68*   BILITOT 0.5 0.7 1.0   ALKPHOS 115 109 122     No results for input(s): INR in the last 72 hours. No results for input(s): Normajean Davy in the last 72 hours. Urinalysis:      Lab Results   Component Value Date/Time    NITRU Negative 12/14/2022 10:11 AM    WBCUA 873 12/14/2022 10:11 AM    BACTERIA 4+ 12/14/2022 10:11 AM    RBCUA 50 12/14/2022 10:11 AM    BLOODU MODERATE 12/14/2022 10:11 AM    SPECGRAV 1.013 12/14/2022 10:11 AM    GLUCOSEU Negative 12/14/2022 10:11 AM       Radiology:  CT CHEST WO CONTRAST   Final Result   Consolidative airspace opacities in the right lung, along with tree-in-bud   nodular opacities, compatible with multilobar infection. RECOMMENDATIONS:   Follow-up radiographs recommended to ensure resolution.              IP CONSULT TO HOSPITALIST  IP CONSULT TO UROLOGY  IP CONSULT TO SOCIAL WORK  IP CONSULT TO PALLIATIVE CARE    Assessment/Plan:    Active Hospital Problems    Diagnosis     Sepsis ([address] [A41.9]      Priority: Medium    HAP (hospital-acquired pneumonia) [J18.9, Y95]      Priority: Medium    Decubital ulcer [L89.90]      Priority: Medium         HAP: Evidenced on CT right upper lobe, right lower lobe  Cefepime, vancomycin and azithromycin  MRSA nasal, Legionella, strep pneumoniae  Sputum culture sent  COVID and influenza negative  Oxygen therapy to maintain saturation greater than 90%, patient currently on room air saturating 92 to 94%  Leucocytosis resolved     Sepsis: Questionable source pneumonia, decubitus, CAUTI  Broad-spectrum antibiotic: as discussed above  WBC 18.9, lactic acid 3.8-> 1.5->1.0  Afebrile, no tachycardia, no hypotension  CRP: 226  Switch to PO abx     Chronic heart failure: BNP 38,570, adventitious breath sounds  Lasix 40 mg IVP twice daily-->PO  Daily weight  Strict intake and output     Decubitus ulcer: Chronic, wound care consulted        CAD HTN, HLD: Continue statin, carvedilol, hydralazine, nifedipine        DVT Prophylaxis: Lovenox  Diet: ADULT ORAL NUTRITION SUPPLEMENT; Breakfast, Dinner; Standard High Calorie/High Protein Oral Supplement  ADULT ORAL NUTRITION SUPPLEMENT; Lunch; Wound Healing Oral Supplement  ADULT DIET; Dysphagia - Soft and Bite Sized; 4 carb choices (60 gm/meal);  Low Fat/Low Chol/High Fiber/BOGDAN  Code Status: DNR-CCA  PT/OT Eval Status: ordered    Lisandra Perdomo MD

## 2022-12-16 NOTE — CARE COORDINATION
Discharge Planning:  SW met with pt. Pts family was visiting with pt in the room. Pt was alert and oriented and able to answer all questions without difficulty. Pt reported he plan remains the same and she plans to return to [place] upon d/c.    Plan: Pt plans to return to [address] [place] [address] upon d/c. No pre cert needed. No COVID test required.    RAFY [facility]  [phone]  Electronically signed by Maya Heart on 12/16/2022 at 2:23 PM

## 2022-12-17 ENCOUNTER — APPOINTMENT (OUTPATIENT)
Dept: GENERAL RADIOLOGY | Age: 77
DRG: 871 | End: 2022-12-17
Payer: MEDICARE

## 2022-12-17 VITALS
WEIGHT: 150.13 LBS | BODY MASS INDEX: 25.63 KG/M2 | RESPIRATION RATE: 18 BRPM | TEMPERATURE: 98 F | SYSTOLIC BLOOD PRESSURE: 105 MMHG | HEIGHT: 64 IN | OXYGEN SATURATION: 91 % | DIASTOLIC BLOOD PRESSURE: 61 MMHG | HEART RATE: 88 BPM

## 2022-12-17 LAB
A/G RATIO: 1.3 (ref 1.1–2.2)
ALBUMIN SERPL-MCNC: 2.5 G/DL (ref 3.4–5)
ALP BLD-CCNC: 144 U/L (ref 40–129)
ALT SERPL-CCNC: 60 U/L (ref 10–40)
ANION GAP SERPL CALCULATED.3IONS-SCNC: 13 MMOL/L (ref 3–16)
AST SERPL-CCNC: 42 U/L (ref 15–37)
BASOPHILS ABSOLUTE: 0 K/UL (ref 0–0.2)
BASOPHILS RELATIVE PERCENT: 0.2 %
BILIRUB SERPL-MCNC: 0.8 MG/DL (ref 0–1)
BUN BLDV-MCNC: 29 MG/DL (ref 7–20)
CALCIUM SERPL-MCNC: 8.2 MG/DL (ref 8.3–10.6)
CHLORIDE BLD-SCNC: 99 MMOL/L (ref 99–110)
CO2: 23 MMOL/L (ref 21–32)
CREAT SERPL-MCNC: 0.8 MG/DL (ref 0.6–1.2)
CULTURE, BLOOD 2: NORMAL
EOSINOPHILS ABSOLUTE: 0.1 K/UL (ref 0–0.6)
EOSINOPHILS RELATIVE PERCENT: 1 %
GFR SERPL CREATININE-BSD FRML MDRD: >60 ML/MIN/{1.73_M2}
GLUCOSE BLD-MCNC: 73 MG/DL (ref 70–99)
GLUCOSE BLD-MCNC: 78 MG/DL (ref 70–99)
GLUCOSE BLD-MCNC: 83 MG/DL (ref 70–99)
GLUCOSE BLD-MCNC: 88 MG/DL (ref 70–99)
HCT VFR BLD CALC: 30.8 % (ref 36–48)
HEMOGLOBIN: 10.1 G/DL (ref 12–16)
LYMPHOCYTES ABSOLUTE: 1.7 K/UL (ref 1–5.1)
LYMPHOCYTES RELATIVE PERCENT: 17.1 %
MCH RBC QN AUTO: 27.8 PG (ref 26–34)
MCHC RBC AUTO-ENTMCNC: 32.7 G/DL (ref 31–36)
MCV RBC AUTO: 85.2 FL (ref 80–100)
MONOCYTES ABSOLUTE: 0.5 K/UL (ref 0–1.3)
MONOCYTES RELATIVE PERCENT: 5.5 %
NEUTROPHILS ABSOLUTE: 7.6 K/UL (ref 1.7–7.7)
NEUTROPHILS RELATIVE PERCENT: 76.2 %
PDW BLD-RTO: 16.1 % (ref 12.4–15.4)
PERFORMED ON: NORMAL
PLATELET # BLD: 193 K/UL (ref 135–450)
PMV BLD AUTO: 7.2 FL (ref 5–10.5)
POTASSIUM SERPL-SCNC: 3 MMOL/L (ref 3.5–5.1)
RBC # BLD: 3.61 M/UL (ref 4–5.2)
SODIUM BLD-SCNC: 135 MMOL/L (ref 136–145)
TOTAL PROTEIN: 4.5 G/DL (ref 6.4–8.2)
WBC # BLD: 9.9 K/UL (ref 4–11)

## 2022-12-17 PROCEDURE — 36415 COLL VENOUS BLD VENIPUNCTURE: CPT

## 2022-12-17 PROCEDURE — 6360000002 HC RX W HCPCS: Performed by: NURSE PRACTITIONER

## 2022-12-17 PROCEDURE — 6370000000 HC RX 637 (ALT 250 FOR IP): Performed by: NURSE PRACTITIONER

## 2022-12-17 PROCEDURE — 6370000000 HC RX 637 (ALT 250 FOR IP): Performed by: INTERNAL MEDICINE

## 2022-12-17 PROCEDURE — 94760 N-INVAS EAR/PLS OXIMETRY 1: CPT

## 2022-12-17 PROCEDURE — 85025 COMPLETE CBC W/AUTO DIFF WBC: CPT

## 2022-12-17 PROCEDURE — 71046 X-RAY EXAM CHEST 2 VIEWS: CPT

## 2022-12-17 PROCEDURE — 2580000003 HC RX 258: Performed by: NURSE PRACTITIONER

## 2022-12-17 PROCEDURE — 80053 COMPREHEN METABOLIC PANEL: CPT

## 2022-12-17 RX ORDER — LEVOFLOXACIN 500 MG/1
500 TABLET, FILM COATED ORAL DAILY
Qty: 5 TABLET | Refills: 0 | Status: SHIPPED | OUTPATIENT
Start: 2022-12-18 | End: 2022-12-23

## 2022-12-17 RX ADMIN — CARVEDILOL 6.25 MG: 6.25 TABLET, FILM COATED ORAL at 09:06

## 2022-12-17 RX ADMIN — TOPIRAMATE 25 MG: 25 TABLET, FILM COATED ORAL at 06:01

## 2022-12-17 RX ADMIN — ENOXAPARIN SODIUM 40 MG: 100 INJECTION SUBCUTANEOUS at 09:06

## 2022-12-17 RX ADMIN — HYDRALAZINE HYDROCHLORIDE 25 MG: 25 TABLET, FILM COATED ORAL at 09:06

## 2022-12-17 RX ADMIN — LEVOTHYROXINE SODIUM 88 MCG: 0.09 TABLET ORAL at 06:01

## 2022-12-17 RX ADMIN — PANTOPRAZOLE SODIUM 40 MG: 40 TABLET, DELAYED RELEASE ORAL at 06:01

## 2022-12-17 RX ADMIN — CLOPIDOGREL BISULFATE 75 MG: 75 TABLET ORAL at 09:06

## 2022-12-17 RX ADMIN — NIFEDIPINE 30 MG: 30 TABLET, EXTENDED RELEASE ORAL at 09:06

## 2022-12-17 RX ADMIN — SODIUM CHLORIDE, PRESERVATIVE FREE 10 ML: 5 INJECTION INTRAVENOUS at 09:14

## 2022-12-17 RX ADMIN — LEVOFLOXACIN 500 MG: 500 TABLET, FILM COATED ORAL at 09:06

## 2022-12-17 RX ADMIN — POTASSIUM BICARBONATE 40 MEQ: 782 TABLET, EFFERVESCENT ORAL at 09:06

## 2022-12-17 RX ADMIN — FUROSEMIDE 40 MG: 40 TABLET ORAL at 09:13

## 2022-12-17 RX ADMIN — HYDROCODONE BITARTRATE AND ACETAMINOPHEN 1 TABLET: 7.5; 325 TABLET ORAL at 12:34

## 2022-12-17 ASSESSMENT — PAIN DESCRIPTION - LOCATION: LOCATION: GENERALIZED

## 2022-12-17 ASSESSMENT — PAIN SCALES - GENERAL
PAINLEVEL_OUTOF10: 8
PAINLEVEL_OUTOF10: 0

## 2022-12-17 ASSESSMENT — PAIN DESCRIPTION - DESCRIPTORS: DESCRIPTORS: ACHING;DISCOMFORT;SORE

## 2022-12-17 NOTE — DISCHARGE INSTR - COC
Continuity of Care Form    Patient Name: Emelina Vegas   :  1945  MRN:  [id number]    Admit date:  2022  Discharge date:  22    Code Status Order: DNR-CCA   Advance Directives:     Admitting Physician:  Lily Bryant DO  PCP: Mak Simon    Discharging Nurse: [facility] Unit/Room#: N4M-4378/5976-00  Discharging Unit Phone Number: [phone]    Emergency Contact:   Extended Emergency Contact Information  Primary Emergency Contact: [address] [facility] Phone: [phone]  Work Phone: [phone]  Relation: Child  Secondary Emergency Contact: [address] [facility] Phone: [phone]  Relation: Grandchild  Preferred language: English   needed?  No    Past Surgical History:  Past Surgical History:   Procedure Laterality Date    ANGIOPLASTY      CARDIAC SURGERY      bypass and stent    HYSTERECTOMY (CERVIX STATUS UNKNOWN)         Immunization History:   Immunization History   Administered Date(s) Administered    COVID-19, PFIZER Bivalent BOOSTER, DO NOT Dilute, (age 12y+), IM, 27 mcg/0.3 mL 10/20/2022    COVID-19, PFIZER PURPLE top, DILUTE for use, (age 15 y+), 30mcg/0.3mL 03/15/2021, 2021       Active Problems:  Patient Active Problem List   Diagnosis Code    Sepsis ([facility] [address] A41.9    HAP (hospital-acquired pneumonia) J18.9, Y95    Decubital ulcer L89.90       Isolation/Infection:   Isolation            No Isolation          Patient Infection Status       Infection Onset Added Last Indicated Last Indicated By Review Planned Expiration Resolved Resolved By    None active    Resolved    COVID-19 (Rule Out) 22 COVID-19, Rapid (Ordered)   22 Rule-Out Test Resulted    C-diff Rule Out 22 Clostridium Difficile Toxin/Antigen (Ordered)   22 Rule-Out Test Resulted            Nurse Assessment:  Last Vital Signs: /69   Pulse 88   Temp 98 °F (36.7 °C) (Oral)   Resp 18   Ht 5' 4\" (1.626 m)   Wt 150 lb 2.1 oz (68.1 kg)   SpO2 91%   BMI 25.77 kg/m²     Last documented pain score (0-10 scale): Pain Level: 0  Last Weight:   Wt Readings from Last 1 Encounters:   12/17/22 150 lb 2.1 oz (68.1 kg)     Mental Status:  disoriented    IV Access:  - None    Nursing Mobility/ADLs:  Walking   Dependent  Transfer  Dependent  Bathing  Dependent  Dressing  Dependent  Toileting  Dependent  Feeding  Assisted  Med Admin  Assisted  Med Delivery   crushed and prefers mixed with pudding    Wound Care Documentation and Therapy:  Wound 12/13/22 Coccyx Mid tunneling wound on coccyx pink red in color (Active)   Wound Image   12/14/22 1220   Wound Etiology Pressure Stage 4 12/14/22 1220   Dressing Status New dressing applied;Clean;Dry; Intact 12/17/22 0239   Wound Cleansed Cleansed with saline 12/17/22 0239   Dressing/Treatment Hydrofiber Ag; Foam 12/17/22 0239   Dressing Change Due 12/16/22 12/14/22 1220   Wound Length (cm) 4.5 cm 12/14/22 1220   Wound Width (cm) 4 cm 12/14/22 1220   Wound Depth (cm) 1 cm 12/14/22 1220   Wound Surface Area (cm^2) 18 cm^2 12/14/22 1220   Wound Volume (cm^3) 18 cm^3 12/14/22 1220   Undermining Starts ___ O'Clock 9 12/14/22 1220   Undermining Ends___ O'Clock 3 12/14/22 1220   Undermining Maxium Distance (cm) 2.5 12/14/22 1220   Wound Assessment Exposed structure bone;Pink/red;Slough;Bleeding 12/17/22 0239   Drainage Amount Small 12/17/22 0239   Drainage Description Serosanguinous 12/17/22 0239   Odor None 12/17/22 0239   Melvi-wound Assessment Denuded 12/17/22 0239   Margins Defined edges 12/17/22 0239   Number of days: 3        Elimination:  Continence: Bowel: Yes  Bladder: Yes  Urinary Catheter: Insertion Date: came into hospital with it.     Colostomy/Ileostomy/Ileal Conduit: No       Date of Last BM: 12/17/2022    Intake/Output Summary (Last 24 hours) at 12/17/2022 1218  Last data filed at 12/17/2022 0905  Gross per 24 hour   Intake 120 ml   Output 1250 ml   Net -1130 ml     I/O last 3 completed shifts:  In: -   Out: 1250 [Urine:1250]    Safety Concerns: At Risk for Falls    Impairments/Disabilities:      None    Nutrition Therapy:  Current Nutrition Therapy:   - Oral Diet:  Carb Control 4 carbs/meal (1800kcals/day)    Routes of Feeding: Oral  Liquids: No Restrictions  Daily Fluid Restriction: no  Last Modified Barium Swallow with Video (Video Swallowing Test): not done    Treatments at the Time of Hospital Discharge:   Respiratory Treatments: ***  Oxygen Therapy:  is not on home oxygen therapy. Ventilator:    - No ventilator support    Rehab Therapies: Physical Therapy and Occupational Therapy  Weight Bearing Status/Restrictions: No weight bearing restrictions  Other Medical Equipment (for information only, NOT a DME order):  ***  Other Treatments: ***    Patient's personal belongings (please select all that are sent with patient):  None    RN SIGNATURE:  Electronically signed by Yeimy Silva RN on 12/17/22 at 3:04 PM EST    CASE MANAGEMENT/SOCIAL WORK SECTION    Inpatient Status Date: 12/13/22    Readmission Risk Assessment Score:  Readmission Risk              Risk of Unplanned Readmission:  16           Discharging to Facility/ Agency   Name: [facility]   Address:  [facility]:[phone]  Fax:    Dialysis Facility (if applicable)   Name:  Address:  Dialysis Schedule:  Phone:  Fax:    / signature: Electronically signed by Chani Graf on 12/17/22 at 12:18 PM EST    PHYSICIAN SECTION    Prognosis: Fair    Condition at Discharge: Stable    Rehab Potential (if transferring to Rehab): Fair    Recommended Labs or Other Treatments After Discharge: NA    Physician Certification: I certify the above information and transfer of Liz Toledo  is necessary for the continuing treatment of the diagnosis listed and that she requires [facility] for less 30 days.      Update Admission H&P: No change in H&P    PHYSICIAN SIGNATURE:  Electronically signed by Radha Mast Pita Vernon MD on 12/17/22 at 12:54 PM EST

## 2022-12-17 NOTE — PLAN OF CARE
Problem: Discharge Planning  Goal: Discharge to home or other facility with appropriate resources  Outcome: Progressing     Problem: Pain  Goal: Verbalizes/displays adequate comfort level or baseline comfort level  Outcome: Progressing     Problem: Nutrition Deficit:  Goal: Optimize nutritional status  Outcome: Progressing     Problem: Cardiovascular - Adult  Goal: Maintains optimal cardiac output and hemodynamic stability  Outcome: Progressing  Goal: Absence of cardiac dysrhythmias or at baseline  Outcome: Progressing     Problem: Metabolic/Fluid and Electrolytes - Adult  Goal: Electrolytes maintained within normal limits  Outcome: Progressing  Goal: Hemodynamic stability and optimal renal function maintained  Outcome: Progressing     Problem: Hematologic - Adult  Goal: Maintains hematologic stability  Outcome: Progressing     Problem: Skin/Tissue Integrity  Goal: Absence of new skin breakdown  Description: 1. Monitor for areas of redness and/or skin breakdown  2. Assess vascular access sites hourly  3. Every 4-6 hours minimum:  Change oxygen saturation probe site  4. Every 4-6 hours:  If on nasal continuous positive airway pressure, respiratory therapy assess nares and determine need for appliance change or resting period.   Outcome: Progressing     Problem: Safety - Adult  Goal: Free from fall injury  Outcome: Progressing     Problem: ABCDS Injury Assessment  Goal: Absence of physical injury  Outcome: Progressing

## 2022-12-17 NOTE — PROGRESS NOTES
Removed pt IV and applied a dry dressing without complications. Printed JIM for placement to [facility]. Tried to call facilty for report but  could not get a hold of nurse. Left my phone number for nurse to return my call to get report. Transportation here to take pt without complications.

## 2022-12-17 NOTE — CARE COORDINATION
DISCHARGE SUMMARY     DATE OF DISCHARGE: 12/17/22    DISCHARGE DESTINATION: LTC    FACILITY: [facility]   PHONE NUMBER: [phone]    FAX NUMBER: [phone]    INSURANCE PRECERT OBTAINED: Not needed per facility - return to LTC    ANCA/MELITON COMPLETED: NA    COVID RESULT: not required      TRANSPORTATION:     Company Name:  [employer] up Time: 3:30    Phone Number: [phone]      COMMENTS: Patient and family aware and in agreement with plans.

## 2022-12-17 NOTE — PLAN OF CARE
Problem: Discharge Planning  Goal: Discharge to home or other facility with appropriate resources  12/17/2022 1409 by Yeimy Silva RN  Outcome: Progressing  Flowsheets (Taken 12/17/2022 0900)  Discharge to home or other facility with appropriate resources: Identify barriers to discharge with patient and caregiver  12/17/2022 0638 by Shabana Gardner RN  Outcome: Progressing  12/17/2022 0637 by Shabana Gardner RN  Outcome: Progressing     Problem: Pain  Goal: Verbalizes/displays adequate comfort level or baseline comfort level  12/17/2022 1409 by Yeimy Silva RN  Outcome: Progressing  12/17/2022 0638 by Shabana Gardner RN  Outcome: Progressing  12/17/2022 0637 by Shabana Gardner RN  Outcome: Progressing     Problem: Nutrition Deficit:  Goal: Optimize nutritional status  12/17/2022 1409 by Yeimy Silva RN  Outcome: Progressing  12/17/2022 0638 by Shabana Gardner RN  Outcome: Progressing  12/17/2022 0637 by Shabana Gardner RN  Outcome: Progressing     Problem: Cardiovascular - Adult  Goal: Maintains optimal cardiac output and hemodynamic stability  12/17/2022 1409 by Yeimy Silva RN  Outcome: Progressing  Flowsheets (Taken 12/17/2022 0900)  Maintains optimal cardiac output and hemodynamic stability: Monitor blood pressure and heart rate  12/17/2022 0638 by Shabana Gardner RN  Outcome: Progressing  12/17/2022 0637 by Shabana Gardner RN  Outcome: Progressing  Goal: Absence of cardiac dysrhythmias or at baseline  12/17/2022 1409 by Yeimy Silva RN  Outcome: Progressing  Flowsheets (Taken 12/17/2022 0900)  Absence of cardiac dysrhythmias or at baseline: Monitor cardiac rate and rhythm  12/17/2022 0638 by Shabana Gardner RN  Outcome: Progressing  12/17/2022 0637 by Shabana Gardner RN  Outcome: Progressing     Problem: Metabolic/Fluid and Electrolytes - Adult  Goal: Electrolytes maintained within normal limits  12/17/2022 1409 by Yeimy Silva RN  Outcome: Progressing  Flowsheets (Taken 12/17/2022 0900)  Electrolytes maintained within normal limits: Monitor labs and assess patient for signs and symptoms of electrolyte imbalances  12/17/2022 0638 by Dorcas Guillory RN  Outcome: Progressing  12/17/2022 0637 by Dorcas Guillory RN  Outcome: Progressing  Goal: Hemodynamic stability and optimal renal function maintained  12/17/2022 1409 by Dina Starr RN  Outcome: Progressing  Flowsheets (Taken 12/17/2022 0900)  Hemodynamic stability and optimal renal function maintained: Monitor labs and assess for signs and symptoms of volume excess or deficit  12/17/2022 0638 by Dorcas Guillory RN  Outcome: Progressing  12/17/2022 0637 by Dorcas Guillory RN  Outcome: Progressing     Problem: Hematologic - Adult  Goal: Maintains hematologic stability  12/17/2022 1409 by Dina Starr RN  Outcome: Progressing  Flowsheets (Taken 12/17/2022 0900)  Maintains hematologic stability: Assess for signs and symptoms of bleeding or hemorrhage  12/17/2022 0638 by Dorcas Guillory RN  Outcome: Progressing  12/17/2022 0637 by Dorcas Guillory RN  Outcome: Progressing     Problem: Skin/Tissue Integrity  Goal: Absence of new skin breakdown  Description: 1. Monitor for areas of redness and/or skin breakdown  2. Assess vascular access sites hourly  3. Every 4-6 hours minimum:  Change oxygen saturation probe site  4. Every 4-6 hours:  If on nasal continuous positive airway pressure, respiratory therapy assess nares and determine need for appliance change or resting period.   12/17/2022 1409 by Dina Starr RN  Outcome: Progressing  12/17/2022 0638 by Dorcas Guillory RN  Outcome: Progressing  12/17/2022 0637 by Dorcas Guillory RN  Outcome: Progressing     Problem: Safety - Adult  Goal: Free from fall injury  12/17/2022 1409 by Dina Starr RN  Outcome: Progressing  12/17/2022 0638 by Dorcas Guillory RN  Outcome: Progressing  12/17/2022 0637 by Dorcas Guillory RN  Outcome: Progressing     Problem: ABCDS Injury Assessment  Goal: Absence of physical injury  12/17/2022 1409 by Alla Craig RN  Outcome: Progressing  12/17/2022 2815 by Geovanna Smith RN  Outcome: Progressing  12/17/2022 0637 by Geovanna Smith RN  Outcome: Progressing

## 2022-12-18 LAB — BLOOD CULTURE, ROUTINE: NORMAL

## 2022-12-20 LAB — CULTURE, BLOOD 2: NORMAL

## 2023-01-01 ENCOUNTER — HOSPITAL ENCOUNTER (INPATIENT)
Age: 78
LOS: 1 days | DRG: 871 | End: 2023-02-16
Attending: EMERGENCY MEDICINE | Admitting: INTERNAL MEDICINE
Payer: MEDICARE

## 2023-01-01 ENCOUNTER — APPOINTMENT (OUTPATIENT)
Dept: GENERAL RADIOLOGY | Age: 78
DRG: 871 | End: 2023-01-01
Payer: MEDICARE

## 2023-01-01 ENCOUNTER — APPOINTMENT (OUTPATIENT)
Dept: CT IMAGING | Age: 78
DRG: 871 | End: 2023-01-01
Payer: MEDICARE

## 2023-01-01 VITALS
OXYGEN SATURATION: 90 % | HEIGHT: 65 IN | DIASTOLIC BLOOD PRESSURE: 38 MMHG | TEMPERATURE: 97.5 F | BODY MASS INDEX: 25.31 KG/M2 | HEART RATE: 98 BPM | WEIGHT: 151.9 LBS | SYSTOLIC BLOOD PRESSURE: 85 MMHG | RESPIRATION RATE: 23 BRPM

## 2023-01-01 DIAGNOSIS — R65.20 SEVERE SEPSIS (HCC): Primary | ICD-10-CM

## 2023-01-01 DIAGNOSIS — A41.9 SEVERE SEPSIS (HCC): Primary | ICD-10-CM

## 2023-01-01 DIAGNOSIS — L89.45: ICD-10-CM

## 2023-01-01 LAB
A/G RATIO: 0.5 (ref 1.1–2.2)
ALBUMIN SERPL-MCNC: 1.9 G/DL (ref 3.4–5)
ALP BLD-CCNC: 153 U/L (ref 40–129)
ALT SERPL-CCNC: 23 U/L (ref 10–40)
ANION GAP SERPL CALCULATED.3IONS-SCNC: 19 MMOL/L (ref 3–16)
AST SERPL-CCNC: 39 U/L (ref 15–37)
BACTERIA: ABNORMAL /HPF
BASE EXCESS VENOUS: -10.3 MMOL/L
BASOPHILS ABSOLUTE: 0.1 K/UL (ref 0–0.2)
BASOPHILS RELATIVE PERCENT: 0.6 %
BILIRUB SERPL-MCNC: 0.4 MG/DL (ref 0–1)
BILIRUBIN URINE: ABNORMAL
BLOOD, URINE: ABNORMAL
BUN BLDV-MCNC: 74 MG/DL (ref 7–20)
CALCIUM SERPL-MCNC: 8.7 MG/DL (ref 8.3–10.6)
CARBOXYHEMOGLOBIN: 0.9 %
CHLORIDE BLD-SCNC: 99 MMOL/L (ref 99–110)
CLARITY: ABNORMAL
CO2: 12 MMOL/L (ref 21–32)
COLOR: YELLOW
COMMENT UA: ABNORMAL
CREAT SERPL-MCNC: 1.5 MG/DL (ref 0.6–1.2)
EKG DIAGNOSIS: NORMAL
EKG Q-T INTERVAL: 314 MS
EKG QRS DURATION: 82 MS
EKG QTC CALCULATION (BAZETT): 424 MS
EKG R AXIS: 7 DEGREES
EKG T AXIS: 157 DEGREES
EKG VENTRICULAR RATE: 110 BPM
EOSINOPHILS ABSOLUTE: 0.1 K/UL (ref 0–0.6)
EOSINOPHILS RELATIVE PERCENT: 0.6 %
EPITHELIAL CELLS, UA: ABNORMAL /HPF (ref 0–5)
GFR SERPL CREATININE-BSD FRML MDRD: 35 ML/MIN/{1.73_M2}
GLUCOSE BLD-MCNC: 83 MG/DL (ref 70–99)
GLUCOSE BLD-MCNC: 84 MG/DL (ref 70–99)
GLUCOSE BLD-MCNC: 86 MG/DL (ref 70–99)
GLUCOSE BLD-MCNC: 96 MG/DL (ref 70–99)
GLUCOSE BLD-MCNC: 98 MG/DL (ref 70–99)
GLUCOSE URINE: NEGATIVE MG/DL
HCO3 VENOUS: 16 MMOL/L (ref 23–29)
HCT VFR BLD CALC: 30.8 % (ref 36–48)
HEMOGLOBIN: 9.4 G/DL (ref 12–16)
KETONES, URINE: 15 MG/DL
LACTIC ACID, SEPSIS: 1.8 MMOL/L (ref 0.4–1.9)
LACTIC ACID, SEPSIS: 2 MMOL/L (ref 0.4–1.9)
LACTIC ACID, SEPSIS: 2 MMOL/L (ref 0.4–1.9)
LEUKOCYTE ESTERASE, URINE: ABNORMAL
LYMPHOCYTES ABSOLUTE: 3.2 K/UL (ref 1–5.1)
LYMPHOCYTES RELATIVE PERCENT: 14 %
MCH RBC QN AUTO: 26.2 PG (ref 26–34)
MCHC RBC AUTO-ENTMCNC: 30.6 G/DL (ref 31–36)
MCV RBC AUTO: 85.6 FL (ref 80–100)
METHEMOGLOBIN VENOUS: 0.5 %
MICROSCOPIC EXAMINATION: YES
MONOCYTES ABSOLUTE: 1.3 K/UL (ref 0–1.3)
MONOCYTES RELATIVE PERCENT: 5.6 %
NEUTROPHILS ABSOLUTE: 18.1 K/UL (ref 1.7–7.7)
NEUTROPHILS RELATIVE PERCENT: 79.2 %
NITRITE, URINE: NEGATIVE
O2 SAT, VEN: 52 %
O2 THERAPY: ABNORMAL
PCO2, VEN: 35.4 MMHG (ref 40–50)
PDW BLD-RTO: 17.9 % (ref 12.4–15.4)
PERFORMED ON: NORMAL
PH UA: 5.5 (ref 5–8)
PH VENOUS: 7.26 (ref 7.35–7.45)
PLATELET # BLD: 581 K/UL (ref 135–450)
PMV BLD AUTO: 6.9 FL (ref 5–10.5)
PO2, VEN: 31 MMHG
POTASSIUM REFLEX MAGNESIUM: 4.7 MMOL/L (ref 3.5–5.1)
PRO-BNP: ABNORMAL PG/ML (ref 0–449)
PROCALCITONIN: >100 NG/ML (ref 0–0.15)
PROTEIN UA: 100 MG/DL
RAPID INFLUENZA  B AGN: NEGATIVE
RAPID INFLUENZA A AGN: NEGATIVE
RBC # BLD: 3.6 M/UL (ref 4–5.2)
RBC UA: ABNORMAL /HPF (ref 0–4)
REASON FOR REJECTION: NORMAL
REJECTED TEST: NORMAL
SARS-COV-2, NAAT: NOT DETECTED
SODIUM BLD-SCNC: 130 MMOL/L (ref 136–145)
SPECIFIC GRAVITY UA: 1.02 (ref 1–1.03)
TCO2 CALC VENOUS: 17 MMOL/L
TOTAL PROTEIN: 5.4 G/DL (ref 6.4–8.2)
TROPONIN: 0.16 NG/ML
URINE TYPE: ABNORMAL
UROBILINOGEN, URINE: 0.2 E.U./DL
VANCOMYCIN RANDOM: 15.4 UG/ML
WBC # BLD: 22.9 K/UL (ref 4–11)
WBC UA: >100 /HPF (ref 0–5)

## 2023-01-01 PROCEDURE — 80053 COMPREHEN METABOLIC PANEL: CPT

## 2023-01-01 PROCEDURE — 2580000003 HC RX 258: Performed by: EMERGENCY MEDICINE

## 2023-01-01 PROCEDURE — 2580000003 HC RX 258: Performed by: INTERNAL MEDICINE

## 2023-01-01 PROCEDURE — 82803 BLOOD GASES ANY COMBINATION: CPT

## 2023-01-01 PROCEDURE — 6360000002 HC RX W HCPCS: Performed by: INTERNAL MEDICINE

## 2023-01-01 PROCEDURE — 87804 INFLUENZA ASSAY W/OPTIC: CPT

## 2023-01-01 PROCEDURE — 71260 CT THORAX DX C+: CPT | Performed by: EMERGENCY MEDICINE

## 2023-01-01 PROCEDURE — 87635 SARS-COV-2 COVID-19 AMP PRB: CPT

## 2023-01-01 PROCEDURE — 94761 N-INVAS EAR/PLS OXIMETRY MLT: CPT

## 2023-01-01 PROCEDURE — 93010 ELECTROCARDIOGRAM REPORT: CPT | Performed by: INTERNAL MEDICINE

## 2023-01-01 PROCEDURE — 36415 COLL VENOUS BLD VENIPUNCTURE: CPT

## 2023-01-01 PROCEDURE — 94760 N-INVAS EAR/PLS OXIMETRY 1: CPT

## 2023-01-01 PROCEDURE — 87040 BLOOD CULTURE FOR BACTERIA: CPT

## 2023-01-01 PROCEDURE — 74177 CT ABD & PELVIS W/CONTRAST: CPT

## 2023-01-01 PROCEDURE — 99285 EMERGENCY DEPT VISIT HI MDM: CPT

## 2023-01-01 PROCEDURE — 2700000000 HC OXYGEN THERAPY PER DAY

## 2023-01-01 PROCEDURE — 71045 X-RAY EXAM CHEST 1 VIEW: CPT

## 2023-01-01 PROCEDURE — 87186 SC STD MICRODIL/AGAR DIL: CPT

## 2023-01-01 PROCEDURE — 80202 ASSAY OF VANCOMYCIN: CPT

## 2023-01-01 PROCEDURE — 6360000004 HC RX CONTRAST MEDICATION: Performed by: EMERGENCY MEDICINE

## 2023-01-01 PROCEDURE — 83605 ASSAY OF LACTIC ACID: CPT

## 2023-01-01 PROCEDURE — 2060000000 HC ICU INTERMEDIATE R&B

## 2023-01-01 PROCEDURE — 84145 PROCALCITONIN (PCT): CPT

## 2023-01-01 PROCEDURE — 6360000002 HC RX W HCPCS: Performed by: EMERGENCY MEDICINE

## 2023-01-01 PROCEDURE — 84484 ASSAY OF TROPONIN QUANT: CPT

## 2023-01-01 PROCEDURE — 94660 CPAP INITIATION&MGMT: CPT

## 2023-01-01 PROCEDURE — 87150 DNA/RNA AMPLIFIED PROBE: CPT

## 2023-01-01 PROCEDURE — 85025 COMPLETE CBC W/AUTO DIFF WBC: CPT

## 2023-01-01 PROCEDURE — 81001 URINALYSIS AUTO W/SCOPE: CPT

## 2023-01-01 PROCEDURE — 93005 ELECTROCARDIOGRAM TRACING: CPT | Performed by: EMERGENCY MEDICINE

## 2023-01-01 PROCEDURE — 83880 ASSAY OF NATRIURETIC PEPTIDE: CPT

## 2023-01-01 RX ORDER — ENOXAPARIN SODIUM 100 MG/ML
40 INJECTION SUBCUTANEOUS NIGHTLY
Status: DISCONTINUED | OUTPATIENT
Start: 2023-01-01 | End: 2023-01-01 | Stop reason: HOSPADM

## 2023-01-01 RX ORDER — SODIUM CHLORIDE 9 MG/ML
INJECTION, SOLUTION INTRAVENOUS PRN
Status: DISCONTINUED | OUTPATIENT
Start: 2023-01-01 | End: 2023-01-01 | Stop reason: SDUPTHER

## 2023-01-01 RX ORDER — VANCOMYCIN 1.75 G/350ML
1250 INJECTION, SOLUTION INTRAVENOUS ONCE
Status: COMPLETED | OUTPATIENT
Start: 2023-01-01 | End: 2023-01-01

## 2023-01-01 RX ORDER — SODIUM CHLORIDE 0.9 % (FLUSH) 0.9 %
5-40 SYRINGE (ML) INJECTION EVERY 12 HOURS SCHEDULED
Status: DISCONTINUED | OUTPATIENT
Start: 2023-01-01 | End: 2023-01-01 | Stop reason: HOSPADM

## 2023-01-01 RX ORDER — SODIUM CHLORIDE 9 MG/ML
25 INJECTION, SOLUTION INTRAVENOUS PRN
Status: DISCONTINUED | OUTPATIENT
Start: 2023-01-01 | End: 2023-01-01 | Stop reason: HOSPADM

## 2023-01-01 RX ORDER — LIDOCAINE HYDROCHLORIDE 10 MG/ML
5 INJECTION, SOLUTION EPIDURAL; INFILTRATION; INTRACAUDAL; PERINEURAL ONCE
Status: DISCONTINUED | OUTPATIENT
Start: 2023-01-01 | End: 2023-01-01 | Stop reason: HOSPADM

## 2023-01-01 RX ORDER — AMOXICILLIN 250 MG
1 CAPSULE ORAL 2 TIMES DAILY
Status: ON HOLD | COMMUNITY
End: 2023-01-01 | Stop reason: HOSPADM

## 2023-01-01 RX ORDER — LORAZEPAM 2 MG/ML
1 CONCENTRATE ORAL EVERY 8 HOURS PRN
Qty: 30 ML | Refills: 0 | Status: SHIPPED | OUTPATIENT
Start: 2023-01-01 | End: 2023-03-08

## 2023-01-01 RX ORDER — SODIUM CHLORIDE 0.9 % (FLUSH) 0.9 %
5-40 SYRINGE (ML) INJECTION PRN
Status: DISCONTINUED | OUTPATIENT
Start: 2023-01-01 | End: 2023-01-01 | Stop reason: HOSPADM

## 2023-01-01 RX ORDER — SODIUM CHLORIDE 0.9 % (FLUSH) 0.9 %
5-40 SYRINGE (ML) INJECTION PRN
Status: DISCONTINUED | OUTPATIENT
Start: 2023-01-01 | End: 2023-01-01 | Stop reason: SDUPTHER

## 2023-01-01 RX ORDER — SODIUM CHLORIDE 9 MG/ML
25 INJECTION, SOLUTION INTRAVENOUS PRN
Status: DISCONTINUED | OUTPATIENT
Start: 2023-01-01 | End: 2023-01-01 | Stop reason: SDUPTHER

## 2023-01-01 RX ORDER — SODIUM CHLORIDE 0.9 % (FLUSH) 0.9 %
5-40 SYRINGE (ML) INJECTION EVERY 12 HOURS SCHEDULED
Status: DISCONTINUED | OUTPATIENT
Start: 2023-01-01 | End: 2023-01-01 | Stop reason: SDUPTHER

## 2023-01-01 RX ORDER — OXYCODONE HYDROCHLORIDE AND ACETAMINOPHEN 5; 325 MG/1; MG/1
1-2 TABLET ORAL EVERY 6 HOURS PRN
Status: ON HOLD | COMMUNITY
End: 2023-01-01 | Stop reason: HOSPADM

## 2023-01-01 RX ORDER — ACETAMINOPHEN 650 MG/1
650 SUPPOSITORY RECTAL EVERY 6 HOURS PRN
Status: DISCONTINUED | OUTPATIENT
Start: 2023-01-01 | End: 2023-01-01 | Stop reason: HOSPADM

## 2023-01-01 RX ORDER — ACETAMINOPHEN 325 MG/1
650 TABLET ORAL EVERY 6 HOURS PRN
Status: DISCONTINUED | OUTPATIENT
Start: 2023-01-01 | End: 2023-01-01 | Stop reason: HOSPADM

## 2023-01-01 RX ORDER — PROMETHAZINE HYDROCHLORIDE 25 MG/1
25 TABLET ORAL EVERY 6 HOURS PRN
Status: ON HOLD | COMMUNITY
End: 2023-01-01 | Stop reason: HOSPADM

## 2023-01-01 RX ORDER — SODIUM CHLORIDE, SODIUM LACTATE, POTASSIUM CHLORIDE, AND CALCIUM CHLORIDE .6; .31; .03; .02 G/100ML; G/100ML; G/100ML; G/100ML
30 INJECTION, SOLUTION INTRAVENOUS ONCE
Status: DISCONTINUED | OUTPATIENT
Start: 2023-01-01 | End: 2023-01-01

## 2023-01-01 RX ORDER — SODIUM CHLORIDE 9 MG/ML
INJECTION, SOLUTION INTRAVENOUS CONTINUOUS
Status: DISCONTINUED | OUTPATIENT
Start: 2023-01-01 | End: 2023-01-01 | Stop reason: HOSPADM

## 2023-01-01 RX ORDER — OXYCODONE HCL 20 MG/ML
10 CONCENTRATE, ORAL ORAL EVERY 4 HOURS PRN
Qty: 30 ML | Refills: 0 | Status: SHIPPED | OUTPATIENT
Start: 2023-01-01 | End: 2023-02-26

## 2023-01-01 RX ORDER — ATROPINE SULFATE 10 MG/ML
SOLUTION/ DROPS OPHTHALMIC
Qty: 10 ML | Refills: 4 | Status: SHIPPED | OUTPATIENT
Start: 2023-01-01

## 2023-01-01 RX ORDER — 0.9 % SODIUM CHLORIDE 0.9 %
500 INTRAVENOUS SOLUTION INTRAVENOUS ONCE
Status: COMPLETED | OUTPATIENT
Start: 2023-01-01 | End: 2023-01-01

## 2023-01-01 RX ADMIN — HYDROMORPHONE HYDROCHLORIDE 0.25 MG: 1 INJECTION, SOLUTION INTRAMUSCULAR; INTRAVENOUS; SUBCUTANEOUS at 17:49

## 2023-01-01 RX ADMIN — SODIUM CHLORIDE 500 ML: 9 INJECTION, SOLUTION INTRAVENOUS at 11:49

## 2023-01-01 RX ADMIN — IOPAMIDOL 75 ML: 755 INJECTION, SOLUTION INTRAVENOUS at 12:04

## 2023-01-01 RX ADMIN — CEFEPIME 2000 MG: 2 INJECTION, POWDER, FOR SOLUTION INTRAVENOUS at 14:18

## 2023-01-01 RX ADMIN — HYDROMORPHONE HYDROCHLORIDE 0.25 MG: 1 INJECTION, SOLUTION INTRAMUSCULAR; INTRAVENOUS; SUBCUTANEOUS at 02:16

## 2023-01-01 RX ADMIN — CEFEPIME 2000 MG: 2 INJECTION, POWDER, FOR SOLUTION INTRAVENOUS at 02:30

## 2023-01-01 RX ADMIN — VANCOMYCIN 1250 MG: 1.75 INJECTION, SOLUTION INTRAVENOUS at 16:08

## 2023-01-01 RX ADMIN — SODIUM CHLORIDE: 9 INJECTION, SOLUTION INTRAVENOUS at 21:20

## 2023-01-01 RX ADMIN — Medication 10 ML: at 21:49

## 2023-01-01 RX ADMIN — VANCOMYCIN HYDROCHLORIDE 750 MG: 750 INJECTION, POWDER, LYOPHILIZED, FOR SOLUTION INTRAVENOUS at 12:55

## 2023-01-01 RX ADMIN — SODIUM CHLORIDE: 9 INJECTION, SOLUTION INTRAVENOUS at 17:49

## 2023-01-01 RX ADMIN — ENOXAPARIN SODIUM 40 MG: 100 INJECTION SUBCUTANEOUS at 21:49

## 2023-01-01 RX ADMIN — SODIUM CHLORIDE: 9 INJECTION, SOLUTION INTRAVENOUS at 07:43

## 2023-01-01 ASSESSMENT — LIFESTYLE VARIABLES
HOW MANY STANDARD DRINKS CONTAINING ALCOHOL DO YOU HAVE ON A TYPICAL DAY: PATIENT DOES NOT DRINK
HOW OFTEN DO YOU HAVE A DRINK CONTAINING ALCOHOL: NEVER
HOW OFTEN DO YOU HAVE A DRINK CONTAINING ALCOHOL: NEVER
HOW MANY STANDARD DRINKS CONTAINING ALCOHOL DO YOU HAVE ON A TYPICAL DAY: PATIENT DOES NOT DRINK

## 2023-01-01 ASSESSMENT — PAIN DESCRIPTION - LOCATION
LOCATION: GENERALIZED
LOCATION: CHEST
LOCATION: GENERALIZED

## 2023-01-01 ASSESSMENT — PAIN - FUNCTIONAL ASSESSMENT
PAIN_FUNCTIONAL_ASSESSMENT: PREVENTS OR INTERFERES SOME ACTIVE ACTIVITIES AND ADLS
PAIN_FUNCTIONAL_ASSESSMENT: PREVENTS OR INTERFERES WITH ALL ACTIVE AND SOME PASSIVE ACTIVITIES
PAIN_FUNCTIONAL_ASSESSMENT: PREVENTS OR INTERFERES WITH ALL ACTIVE AND SOME PASSIVE ACTIVITIES
PAIN_FUNCTIONAL_ASSESSMENT: 0-10

## 2023-01-01 ASSESSMENT — PAIN DESCRIPTION - DESCRIPTORS
DESCRIPTORS: DISCOMFORT
DESCRIPTORS: ACHING
DESCRIPTORS: DISCOMFORT

## 2023-01-01 ASSESSMENT — PAIN DESCRIPTION - ONSET
ONSET: ON-GOING
ONSET: ON-GOING

## 2023-01-01 ASSESSMENT — PAIN DESCRIPTION - FREQUENCY
FREQUENCY: CONTINUOUS
FREQUENCY: CONTINUOUS

## 2023-01-01 ASSESSMENT — PAIN DESCRIPTION - PAIN TYPE
TYPE: ACUTE PAIN
TYPE: ACUTE PAIN

## 2023-01-01 ASSESSMENT — PAIN SCALES - GENERAL
PAINLEVEL_OUTOF10: 6
PAINLEVEL_OUTOF10: 7
PAINLEVEL_OUTOF10: 4

## 2023-01-01 ASSESSMENT — PAIN DESCRIPTION - ORIENTATION: ORIENTATION: MID

## 2023-02-15 NOTE — CONSULTS
[facility]  Palliative Care   Consult Note    NAME:  Sabine Ewing RECORD NUMBER:  [id number]  AGE: 76 y.o. GENDER: female  : 1945  TODAY'S DATE:  2/15/2023    Subjective     Reason for Consult:  goals of care, hospice discussion, and code status   Visit Type: Initial Consult      Junior Troy is a 76 y.o. female referred by:   [x] Physician    PAST MEDICAL HISTORY      Diagnosis Date    Acid reflux     Anxiety     Depression     Hiatal hernia     Hyperlipidemia     Hypertension     MI (myocardial infarction) ([facility] [address] 2003    Thyroid disease     Vertigo        PAST SURGICAL HISTORY  Past Surgical History:   Procedure Laterality Date    ANGIOPLASTY      CARDIAC SURGERY      bypass and stent    HYSTERECTOMY (CERVIX STATUS UNKNOWN)         FAMILY HISTORY  No family history on file. SOCIAL HISTORY  Social History     Tobacco Use    Smoking status: Never    Smokeless tobacco: Never   Vaping Use    Vaping Use: Never used   Substance Use Topics    Alcohol use: Not Currently    Drug use: Not Currently       ALLERGIES  Allergies   Allergen Reactions    Demerol Hcl [Meperidine] Palpitations    Morphine Palpitations       MEDICATIONS  No current facility-administered medications on file prior to encounter.      Current Outpatient Medications on File Prior to Encounter   Medication Sig Dispense Refill    sodium hypochlorite (DAKINS) 0.125 % SOLN external solution Apply 1 each topically daily Apply to sacrum Q day      guaiFENesin (MUCINEX) 600 MG extended release tablet Take 1,200 mg by mouth 2 times daily      ipratropium-albuterol (DUONEB) 0.5-2.5 (3) MG/3ML SOLN nebulizer solution Inhale 1 vial into the lungs 3 times daily as needed for Shortness of Breath      hydrOXYzine HCl (ATARAX) 25 MG tablet Take 25 mg by mouth every 8 hours as needed for Itching      Balsam Peru-Castor Oil (VENELEX) OINT ointment Apply 1 application topically 2 times daily Apply to buttocks carvedilol (COREG) 6.25 MG tablet Take 6.25 mg by mouth 2 times daily (with meals)      topiramate (TOPAMAX) 25 MG tablet Take 50 mg by mouth nightly TDD 75 mg      traZODone (DESYREL) 100 MG tablet Take 100 mg by mouth nightly      levothyroxine (SYNTHROID) 88 MCG tablet Take 88 mcg by mouth Daily      HYDROcodone-acetaminophen (NORCO) 5-325 MG per tablet Take 1 tablet by mouth every 6 hours as needed for Pain.      pantoprazole (PROTONIX) 40 MG tablet Take 40 mg by mouth daily      dexamethasone (DECADRON) 6 MG tablet Take 6 mg by mouth daily (with breakfast)      clopidogrel (PLAVIX) 75 MG tablet Take 75 mg by mouth daily      NIFEdipine (ADALAT CC) 30 MG extended release tablet Take 30 mg by mouth daily      torsemide (DEMADEX) 20 MG tablet Take 20 mg by mouth daily      potassium chloride (KLOR-CON M) 20 MEQ extended release tablet Take 20 mEq by mouth daily      isosorbide mononitrate (IMDUR) 60 MG extended release tablet Take 60 mg by mouth daily      zinc gluconate 50 MG tablet Take 50 mg by mouth daily      atorvastatin (LIPITOR) 40 MG tablet Take 40 mg by mouth nightly      vitamin C (ASCORBIC ACID) 500 MG tablet Take 1,000 mg by mouth daily      Cholecalciferol (VITAMIN D) 50 MCG (2000 UT) CAPS capsule Take 1 capsule by mouth daily      nabumetone (RELAFEN) 500 MG tablet Take 500 mg by mouth daily as needed for Pain      hydrALAZINE (APRESOLINE) 25 MG tablet Take 25 mg by mouth 3 times daily      topiramate (TOPAMAX) 25 MG tablet Take 25 mg by mouth every morning (before breakfast) TDD 75 mg         Objective         BP (!) 107/39   Pulse (!) 105   Temp 98.3 °F (36.8 °C) (Axillary)   Resp 24   Ht 5' 5\" (1.651 m)   Wt 164 lb 0.4 oz (74.4 kg)   SpO2 96%   BMI 27.29 kg/m²     Code Status: DNR CC    Advanced Directives: completed her grand daughter is her TRISTON Florian [phone]    Assessment        Management and Education    Persons available for education:       [] Self     [] Caregiver [] Spouse       [x] Other Family Member   []  Other    Spiritual History:  notified: yes     Does the patient have a Primary Care Physician? Yes     Palliative Performance Scale:  60% [] Ambulation reduced; Significant disease; Can't do hobbies/housework; intake normal or reduced; occasional assist; LOC full/confusion  50% [] Mainly sit/lie; Extensive disease; Can't do any work; Considerable assist; intake normal or reduced; LOC full/confusion  40% [] Mainly in bed; Extensive disease; Mainly assist; intake normal or reduced; occasional assist; LOC full/confusion  30% [x] Bed Bound; Extensive disease; Total care; intake reduced; LOC full/confusion  20% [] Bed Bound; Extensive disease; Total care; intake minimal; Drowsy/coma  10% [] Bed Bound; Extensive disease; Total care; Mouth care only; Drowsy/coma  0 [] Death    Level of patient/caregiver understanding able to:        [x] Verbalize Understanding   [] Demonstrate Understanding       [] Teach Back       [] Needs Reinforcement     []  Other:      Teaching Time:  0hours  30 minutes     Plan        Social Service Consult Made:  Yes  Assistance filling out Living Will/HPOA:  No      Discharge Plan:  Clarification of medical condition to patient and family  Code status clarified: [facility]  Palliative care orders introduced  Provided information about hospice    Discharge Environment:  [x] Hospice Consult Agency: [facility] has been providing care    [x] ECF with Hospice    Discussion: Patient admitted from [facility] for shortness of breath. Her last admission was in 12/17/2022 for sepsis, pneumonia, decubital ulcer. At that admission patient and family met with palliative care discussed care at [facility] and decided on DNR status and to have hospice involved once treatment was complete for pneumonia. Ms Sarah Valladares has been under care of [facility] for about 2 months but was a full code.  Today when she decompensated the ECF called EMS and she was brought to hospital before the hospice nurse could evaluate the situation. I saw the patient in ED she was on bipap oriented to self but clearly not comfortable. I have reviewed the chart she has a non healing sacral wound. I have called her grand daughter/HPOA Juan Antonio Simeon to discuss goals of care. Juan Antonio Simeon knows her grandmother would not want chest compressions/shocking, ventilator or resuscitative meds. Juan Antonio Simeon would like for the patients children to speak with Dr Kelsey Greenberg if possible tonight. Juan Antonio Simeon agrees returning to the [facility] with hospice care tomorrow is best option for her grand mother. Dr Kelsey Greenberg informed of above orders noted. I will continue to follow Ms. Luong's care as needed. Thank you for allowing me to participate in the care of Ms. Sheryle Kelly .      Electronically signed by Juli Guerrero RN, BSN, [facility] on 2/15/2023 at 4:22 PM  [address] [place] [address]  Office: [phone]

## 2023-02-15 NOTE — PROGRESS NOTES
4 Eyes Skin Assessment     NAME:  Owen Shields OF BIRTH:  1945  MEDICAL RECORD NUMBER:  [id number]    The patient is being assessed for  Admission    I agree that One RN has performed a thorough Head to Toe Skin Assessment on the patient. ALL assessment sites listed below have been assessed. Areas assessed by both nurses:    Head, Face, Ears, Shoulders, Back, Chest, Arms, Elbows, Hands, Sacrum. Buttock, Coccyx, Ischium, and Legs. Feet and Heels        Does the Patient have a Wound? Yes wound(s) were present on assessment.  LDA wound assessment was Initiated and completed by RN  L 2nd toe- open scab  Open abrasions between legs/groin  R 2nd toe- redness  R heel- dark area  Skin tear with steri strips to LFA  Large areas of scattered bruising with fluid filled blisters  Multiple open areas to buttocks  Extremely large unstageable wound to coccyx- bone noted       Fredi Prevention initiated by RN: Yes   Wound Care Orders initiated by RN: Yes    Pressure Injury (Stage 3,4, Unstageable, DTI, NWPT, and Complex wounds) if present, place referral order by RN under : Yes    New and Established Ostomies, if present place, referral order under : Yes      Nurse 1 eSignature: Electronically signed by Verner Pontes, RN on 2/15/23 at 4:50 PM EST    **SHARE this note so that the co-signing nurse can place an eSignature**    Nurse 2 eSignature: Electronically signed by Betty Davis RN on 2/15/23 at 5:13 PM EST

## 2023-02-15 NOTE — PROGRESS NOTES
Clinical Pharmacy Note  Vancomycin Consult    Pharmacy consult received for one-time dose of vancomycin in the Emergency Department per Dr. Arcelia Mittal. Ht Readings from Last 1 Encounters:   02/15/23 5' 5\" (1.651 m)        Wt Readings from Last 1 Encounters:   02/15/23 164 lb 0.4 oz (74.4 kg)         Assessment/Plan:  Vancomycin 1250 x 1 in ED. If Vancomycin is to continue on admission and pharmacy is to manage dosing, please re-consult with admission orders.

## 2023-02-15 NOTE — H&P
Hospital Medicine History & Physical      PCP: Ida Montesinos    Date of Admission: 2/15/2023    Date of Service: Pt seen/examined on 2.15.23 and Admitted to Inpatient with expected LOS greater than two midnights due to medical therapy. Chief Complaint: Shortness of breath/altered mental status      History Of Present Illness:      76 y.o. female who presented to [facility] from [address] [facility] with altered mental status. Patient has had declining condition over the last week per discussion with granddaughter who provides history. Patient is a poor historian and current state. Patient has been enrolled in hospice there due to declining condition. She has a history of multiple medical problems and has been at the nursing home for several months. She has a large extensive bedsore and granddaughter states that patient has been noncompliant with keeping off of the bedsore. Granddaughter states that patient has been showing signs of giving up. She had previously been made a DNR at the hospital during her prior admission here but this was changed when she went to the nursing home for some reason which is not exactly clear (something about a DNR form not being signed)  Patient noted to be in moderate distress here she is on BiPAP. She is very lethargic. And looks quite ill    Past Medical History:          Diagnosis Date    Acid reflux     Anxiety     Depression     Hiatal hernia     Hyperlipidemia     Hypertension     MI (myocardial infarction) ([facility] [address] 2000, 2003    Thyroid disease     Vertigo        Past Surgical History:          Procedure Laterality Date    ANGIOPLASTY      CARDIAC SURGERY      bypass and stent    HYSTERECTOMY (CERVIX STATUS UNKNOWN)         Medications Prior to Admission:      Prior to Admission medications    Medication Sig Start Date End Date Taking?  Authorizing Provider   sodium hypochlorite (DAKINS) 0.125 % SOLN external solution Apply 1 each topically daily Apply to sacrum Q day    Historical Provider, MD   guaiFENesin (MUCINEX) 600 MG extended release tablet Take 1,200 mg by mouth 2 times daily    Historical Provider, MD   ipratropium-albuterol (DUONEB) 0.5-2.5 (3) MG/3ML SOLN nebulizer solution Inhale 1 vial into the lungs 3 times daily as needed for Shortness of Breath    Historical Provider, MD   hydrOXYzine HCl (ATARAX) 25 MG tablet Take 25 mg by mouth every 8 hours as needed for Itching    Historical Provider, MD   Balsam Peru-Castor Oil (VENELEX) OINT ointment Apply 1 application topically 2 times daily Apply to buttocks    Historical Provider, MD   carvedilol (COREG) 6.25 MG tablet Take 6.25 mg by mouth 2 times daily (with meals)    Historical Provider, MD   topiramate (TOPAMAX) 25 MG tablet Take 50 mg by mouth nightly TDD 75 mg    Historical Provider, MD   traZODone (DESYREL) 100 MG tablet Take 100 mg by mouth nightly    Historical Provider, MD   levothyroxine (SYNTHROID) 88 MCG tablet Take 88 mcg by mouth Daily    Historical Provider, MD   HYDROcodone-acetaminophen (NORCO) 5-325 MG per tablet Take 1 tablet by mouth every 6 hours as needed for Pain.     Historical Provider, MD   pantoprazole (PROTONIX) 40 MG tablet Take 40 mg by mouth daily    Historical Provider, MD   dexamethasone (DECADRON) 6 MG tablet Take 6 mg by mouth daily (with breakfast)    Historical Provider, MD   clopidogrel (PLAVIX) 75 MG tablet Take 75 mg by mouth daily    Historical Provider, MD   NIFEdipine (ADALAT CC) 30 MG extended release tablet Take 30 mg by mouth daily    Historical Provider, MD   torsemide (DEMADEX) 20 MG tablet Take 20 mg by mouth daily    Historical Provider, MD   potassium chloride (KLOR-CON M) 20 MEQ extended release tablet Take 20 mEq by mouth daily    Historical Provider, MD   isosorbide mononitrate (IMDUR) 60 MG extended release tablet Take 60 mg by mouth daily    Historical Provider, MD   zinc gluconate 50 MG tablet Take 50 mg by mouth daily    Historical Provider, MD   atorvastatin (LIPITOR) 40 MG tablet Take 40 mg by mouth nightly    Historical Provider, MD   vitamin C (ASCORBIC ACID) 500 MG tablet Take 1,000 mg by mouth daily    Historical Provider, MD   Cholecalciferol (VITAMIN D) 50 MCG (2000 UT) CAPS capsule Take 1 capsule by mouth daily    Historical Provider, MD   nabumetone (RELAFEN) 500 MG tablet Take 500 mg by mouth daily as needed for Pain    Historical Provider, MD   hydrALAZINE (APRESOLINE) 25 MG tablet Take 25 mg by mouth 3 times daily    Historical Provider, MD   topiramate (TOPAMAX) 25 MG tablet Take 25 mg by mouth every morning (before breakfast) TDD 75 mg    Historical Provider, MD       Allergies:  Demerol hcl [meperidine] and Morphine    Social History:      The patient currently lives in a nursing home    TOBACCO:   reports that she has never smoked. She has never used smokeless tobacco.  ETOH:   reports that she does not currently use alcohol. E-cigarette/Vaping       Questions Responses    E-cigarette/Vaping Use Never User    Start Date     Passive Exposure     Quit Date     Counseling Given     Comments               Family History:       Reviewed and negative in regards to presenting illness/complaint. No family history on file. REVIEW OF SYSTEMS COMPLETED:   Pertinent positives as noted in the HPI. All other systems reviewed and negative. PHYSICAL EXAM PERFORMED:    BP (!) 107/39   Pulse (!) 105   Temp 98.3 °F (36.8 °C) (Axillary)   Resp 24   Ht 5' 5\" (1.651 m)   Wt 164 lb 0.4 oz (74.4 kg)   SpO2 96%   BMI 27.29 kg/m²     General appearance: Toxic appearing female sitting in stretcher in moderate distress on BiPAP  HEENT:  Normal cephalic, atraumatic without obvious deformity. Pupils equal, round, and reactive to light. Extra ocular muscles intact. Conjunctivae/corneas clear. Neck: Supple, with full range of motion. No jugular venous distention. Trachea midline.   Respiratory: Diffuse rhonchi bilaterally  Cardiovascular: Sinus tachycardia  Abdomen: Soft, non-tender, non-distended with normal bowel sounds. Musculoskeletal:  No clubbing, cyanosis or edema bilaterally. Full range of motion without deformity. Skin: Extensive large sacral decubitus ulcer to the bone with drainage  Neurologic:  Neurovascularly intact without any focal sensory/motor deficits. Cranial nerves: II-XII intact, grossly non-focal.  Psychiatric: Disoriented at times  Capillary Refill: Poor greater than 3-second  Peripheral Pulses: +2 palpable, equal bilaterally       Labs:     Recent Labs     02/15/23  0956   WBC 22.9*   HGB 9.4*   HCT 30.8*   *     Recent Labs     02/15/23  0956   *   K 4.7   CL 99   CO2 12*   BUN 74*   CREATININE 1.5*   CALCIUM 8.7     Recent Labs     02/15/23  0956   AST 39*   ALT 23   BILITOT 0.4   ALKPHOS 153*     No results for input(s): INR in the last 72 hours. Recent Labs     02/15/23  0956   TROPONINI 0.16*       Urinalysis:      Lab Results   Component Value Date/Time    NITRU Negative 02/15/2023 12:41 PM    WBCUA >100 02/15/2023 12:41 PM    BACTERIA 4+ 02/15/2023 12:41 PM    RBCUA 5-10 02/15/2023 12:41 PM    BLOODU LARGE 02/15/2023 12:41 PM    SPECGRAV 1.020 02/15/2023 12:41 PM    GLUCOSEU Negative 02/15/2023 12:41 PM       Radiology:     inDependently reviewed by me  CT CHEST PULMONARY EMBOLISM W CONTRAST   Final Result   Chest      Motion limited. No central pulmonary embolus identified. Hazy ground-glass opacities throughout the lungs, likely   postinflammatory-infectious, greatest in the left lung base. Filling   defects-mucous plugging is seen, greatest in the left lung base. .  When   compared to prior study December 2022 right lung is better aerated overall,   however there is increased consolidative change in the left lung base.,      Abdomen and pelvis      Gas is seen in the collecting system on the right which may be secondary to   recent instrumentation.   However, given the underlying urothelial thickening,   recommend correlation with urinalysis to exclude urinary tract infection. Large stone is seen in the right kidney. Soft tissue thickening overlies the sacrum, compatible with history of   decubitus ulcer. No aggressive bony destruction is seen. .  No drainable   fluid collection noted      Fecal impaction      Severe atherosclerotic narrowing of the abdominal aorta         CT ABDOMEN PELVIS W IV CONTRAST Additional Contrast? None   Final Result   Chest      Motion limited. No central pulmonary embolus identified. Hazy ground-glass opacities throughout the lungs, likely   postinflammatory-infectious, greatest in the left lung base. Filling   defects-mucous plugging is seen, greatest in the left lung base. .  When   compared to prior study December 2022 right lung is better aerated overall,   however there is increased consolidative change in the left lung base.,      Abdomen and pelvis      Gas is seen in the collecting system on the right which may be secondary to   recent instrumentation. However, given the underlying urothelial thickening,   recommend correlation with urinalysis to exclude urinary tract infection. Large stone is seen in the right kidney. Soft tissue thickening overlies the sacrum, compatible with history of   decubitus ulcer. No aggressive bony destruction is seen. .  No drainable   fluid collection noted      Fecal impaction      Severe atherosclerotic narrowing of the abdominal aorta         XR CHEST PORTABLE   Final Result   Limited exam as described above. No acute cardiopulmonary findings             Consults:    IP CONSULT TO HOSPITALIST  IP CONSULT TO PALLIATIVE CARE  IP CONSULT TO PHARMACY  IP CONSULT TO PULMONOLOGY    ASSESSMENT:    Sepsis  Acute respiratory failure hypercarbic hypoxic  Debility  Protein calorie malnutrition  GERD  Coronary artery disease  Hypothyroidism      PLAN:    Patient is in poor health.   She has a pneumonia along with a very extensive decubitus ulcer which she is not really been cooperating with to allow it to heal at the nursing home  Patient is chronically ill and I do not think she has any chance of recovery her survivability to premorbid level of baseline  Continue broad-spectrum antibiotics for now vancomycin and cefepime  Procalcitonin level is very elevated suggesting bacterial infection  Blood cultures have been ordered and sent  Giving IV fluids per sepsis protocol  Serial lactates  Family has expressed to me that they would like to make her a DNR but would like to have that discussion when all family are present which will likely occur later this evening around 7 PM and I will have a discussion with them either in person or on the phone  Palliative care has been consulted    DVT Prophylaxis: lovenox  Diet: No diet orders on file  Code Status: DNR-CC    Dispo -1 to 2 days       Rico Thompson MD    Thank you Marvin Chaudhry for the opportunity to be involved in this patient's care. If you have any questions or concerns please feel free to contact me at [phone].

## 2023-02-15 NOTE — PROGRESS NOTES
Call received from Tatyana Schneider () with [facility]. Phone number is [phone]. Would appreciate updates.

## 2023-02-15 NOTE — CONSULTS
Clinical Pharmacy Note  Vancomycin Consult    Alanna Sarmiento is a 76 y.o. female ordered Vancomycin for CAP; consult received from Dr. Arrigaa Courser to manage therapy. Also receiving cefepime. Patient Active Problem List   Diagnosis    Sepsis ([address]    HAP (hospital-acquired pneumonia)    Decubital ulcer       Allergies:  Demerol hcl [meperidine] and Morphine     Temp max:  Temp (24hrs), Av.7 °F (37.1 °C), Min:98.1 °F (36.7 °C), Max:99.7 °F (37.6 °C)      Recent Labs     02/15/23  0956   WBC 22.9*       Recent Labs     02/15/23  0956   BUN 74*   CREATININE 1.5*         Intake/Output Summary (Last 24 hours) at 2/15/2023 1840  Last data filed at 2/15/2023 1226  Gross per 24 hour   Intake --   Output 20 ml   Net -20 ml       Culture Results:  pending    Ht Readings from Last 1 Encounters:   02/15/23 5' 5\" (1.651 m)        Wt Readings from Last 1 Encounters:   02/15/23 151 lb 3.8 oz (68.6 kg)         Estimated Creatinine Clearance: 30 mL/min (A) (based on SCr of 1.5 mg/dL (H)). Assessment/Plan:  1250 mg x 1 in ED  JAIRO so will dose intermittently on levels until improved. Random level tomorrow morning. Monitor renal function (urine output, BUN/SCr). Dose adjustments may be necessary with a significant change in renal function. Thank you for the consult. Will continue to follow.   Rosanna Hampton PharmD  2/15/2023  6:40 PM

## 2023-02-15 NOTE — ED TRIAGE NOTES
Pt to ED from [facility] via [facility] with c/o of shortness of breath and chest pain. Pt is alert to self.   VSS on 5L NC

## 2023-02-15 NOTE — ED PROVIDER NOTES
[address] EMERGENCY DEPARTMENT    CHIEF COMPLAINT  Chest Pain and Shortness of Breath (From [facility] with chest pain and shortness of breath)       HISTORY OF PRESENT ILLNESS  Jaimie Ashford is a 76 y.o. female who presents to the ED with complaint of shortness of breath. She presents from [facility] via EMS. EMS reports patient was hypoxic with SPO2 in the 80s percent on 2 L nasal cannula. They placed her on a nonrebreather with resolution of the hypoxia. The patient is a poor historian and does not contribute meaningfully to the history. I reviewed the patient's discharge summary from 2/9/2023 (Dr. Daniel Stiles, [facility]) reveals patient was treated for hospital-acquired pneumonia noted on CT imaging, right upper and lower lobes. She had COVID and influenza testing at that time that was negative. It appears she was ultimately weaned to room air. She was noted to have a decubitus ulcer as well as caudae. She was ultimately transitioned to p.o. antibiotics after treatment with Vanco, cefepime, azithromycin. Treated for CHF with IV Lasix transition to p.o. I have reviewed the following from the nursing documentation:    Past Medical History:   Diagnosis Date    Acid reflux     Anxiety     Depression     Hiatal hernia     Hyperlipidemia     Hypertension     MI (myocardial infarction) ([facility] [address] 2000, 2003    Thyroid disease     Vertigo      Past Surgical History:   Procedure Laterality Date    ANGIOPLASTY      CARDIAC SURGERY      bypass and stent    HYSTERECTOMY (CERVIX STATUS UNKNOWN)       No family history on file. Social History     Socioeconomic History    Marital status:       Spouse name: Not on file    Number of children: Not on file    Years of education: Not on file    Highest education level: Not on file   Occupational History    Not on file   Tobacco Use    Smoking status: Never    Smokeless tobacco: Never   Vaping Use    Vaping Use: Never used   Substance and Sexual Activity    Alcohol use: Not Currently    Drug use: Not Currently    Sexual activity: Not Currently   Other Topics Concern    Not on file   Social History Narrative    Not on file     Social Determinants of Health     Financial Resource Strain: Not on file   Food Insecurity: Not on file   Transportation Needs: Not on file   Physical Activity: Not on file   Stress: Not on file   Social Connections: Not on file   Intimate Partner Violence: Not on file   Housing Stability: Not on file     No current facility-administered medications for this encounter.      Current Outpatient Medications   Medication Sig Dispense Refill    sodium hypochlorite (DAKINS) 0.125 % SOLN external solution Apply 1 each topically daily Apply to sacrum Q day      guaiFENesin (MUCINEX) 600 MG extended release tablet Take 1,200 mg by mouth 2 times daily      ipratropium-albuterol (DUONEB) 0.5-2.5 (3) MG/3ML SOLN nebulizer solution Inhale 1 vial into the lungs 3 times daily as needed for Shortness of Breath      hydrOXYzine HCl (ATARAX) 25 MG tablet Take 25 mg by mouth every 8 hours as needed for Itching      Balsam [place]-Castor Oil (VENELEX) OINT ointment Apply 1 application topically 2 times daily Apply to buttocks      carvedilol (COREG) 6.25 MG tablet Take 6.25 mg by mouth 2 times daily (with meals)      topiramate (TOPAMAX) 25 MG tablet Take 50 mg by mouth nightly TDD 75 mg      traZODone (DESYREL) 100 MG tablet Take 100 mg by mouth nightly      levothyroxine (SYNTHROID) 88 MCG tablet Take 88 mcg by mouth Daily      HYDROcodone-acetaminophen (NORCO) 5-325 MG per tablet Take 1 tablet by mouth every 6 hours as needed for Pain.      pantoprazole (PROTONIX) 40 MG tablet Take 40 mg by mouth daily      dexamethasone (DECADRON) 6 MG tablet Take 6 mg by mouth daily (with breakfast)      clopidogrel (PLAVIX) 75 MG tablet Take 75 mg by mouth daily      NIFEdipine (ADALAT CC) 30 MG extended release tablet Take 30 mg by mouth daily      torsemide (DEMADEX) 20 MG tablet Take 20 mg by mouth daily      potassium chloride (KLOR-CON M) 20 MEQ extended release tablet Take 20 mEq by mouth daily      isosorbide mononitrate (IMDUR) 60 MG extended release tablet Take 60 mg by mouth daily      zinc gluconate 50 MG tablet Take 50 mg by mouth daily      atorvastatin (LIPITOR) 40 MG tablet Take 40 mg by mouth nightly      vitamin C (ASCORBIC ACID) 500 MG tablet Take 1,000 mg by mouth daily      Cholecalciferol (VITAMIN D) 50 MCG (2000 UT) CAPS capsule Take 1 capsule by mouth daily      nabumetone (RELAFEN) 500 MG tablet Take 500 mg by mouth daily as needed for Pain      hydrALAZINE (APRESOLINE) 25 MG tablet Take 25 mg by mouth 3 times daily      topiramate (TOPAMAX) 25 MG tablet Take 25 mg by mouth every morning (before breakfast) TDD 75 mg       Allergies   Allergen Reactions    Demerol Hcl [Meperidine] Palpitations    Morphine Palpitations         PHYSICAL EXAM  ED Triage Vitals [02/15/23 0851]   Enc Vitals Group      BP (!) 113/48      Heart Rate (!) 105      Resp 25      Temp 99.7 °F (37.6 °C)      Temp Source Axillary      SpO2 94 %      Weight 164 lb 0.4 oz (74.4 kg)      Height 5' 5\" (1.651 m)      Head Circumference       Peak Flow       Pain Score       Pain Loc       Pain Edu? Excl. in [address]? General appearance: Awake. Appears chronically ill. HEENT: Normocephalic. Atraumatic. Mucous membranes are moist.  Heart/Chest: RRR. No murmurs. Lungs: Diffuse crackles. Tachypneic. NRB in place. Abdomen: Soft. Non-tender. Non-distended. No rebound or guarding. Skin: Warm and dry. Sacral gloria ulcer. Neurological: Alert, moving all four extremities. LABS  I have reviewed all labs for this visit.    Results for orders placed or performed during the hospital encounter of 02/15/23   Rapid influenza A/B antigens    Specimen: Nasopharyngeal   Result Value Ref Range    Rapid Influenza A Ag Negative Negative    Rapid Influenza B Ag Negative Negative   COVID-19, Rapid    Specimen: Nasopharyngeal Swab   Result Value Ref Range    SARS-CoV-2, NAAT Not Detected Not Detected   CBC with Auto Differential   Result Value Ref Range    WBC 22.9 (H) 4.0 - 11.0 K/uL    RBC 3.60 (L) 4.00 - 5.20 M/uL    Hemoglobin 9.4 (L) 12.0 - 16.0 g/dL    Hematocrit 30.8 (L) 36.0 - 48.0 %    MCV 85.6 80.0 - 100.0 fL    MCH 26.2 26.0 - 34.0 pg    MCHC 30.6 (L) 31.0 - 36.0 g/dL    RDW 17.9 (H) 12.4 - 15.4 %    Platelets 581 (H) 135 - 450 K/uL    MPV 6.9 5.0 - 10.5 fL    Neutrophils % 79.2 %    Lymphocytes % 14.0 %    Monocytes % 5.6 %    Eosinophils % 0.6 %    Basophils % 0.6 %    Neutrophils Absolute 18.1 (H) 1.7 - 7.7 K/uL    Lymphocytes Absolute 3.2 1.0 - 5.1 K/uL    Monocytes Absolute 1.3 0.0 - 1.3 K/uL    Eosinophils Absolute 0.1 0.0 - 0.6 K/uL    Basophils Absolute 0.1 0.0 - 0.2 K/uL   Comprehensive Metabolic Panel w/ Reflex to MG   Result Value Ref Range    Sodium 130 (L) 136 - 145 mmol/L    Potassium reflex Magnesium 4.7 3.5 - 5.1 mmol/L    Chloride 99 99 - 110 mmol/L    CO2 12 (LL) 21 - 32 mmol/L    Anion Gap 19 (H) 3 - 16    Glucose 96 70 - 99 mg/dL    BUN 74 (H) 7 - 20 mg/dL    Creatinine 1.5 (H) 0.6 - 1.2 mg/dL    Est, Glom Filt Rate 35 (A) >60    Calcium 8.7 8.3 - 10.6 mg/dL    Total Protein 5.4 (L) 6.4 - 8.2 g/dL    Albumin 1.9 (L) 3.4 - 5.0 g/dL    Albumin/Globulin Ratio 0.5 (L) 1.1 - 2.2    Total Bilirubin 0.4 0.0 - 1.0 mg/dL    Alkaline Phosphatase 153 (H) 40 - 129 U/L    ALT 23 10 - 40 U/L    AST 39 (H) 15 - 37 U/L   Troponin   Result Value Ref Range    Troponin 0.16 (H) <0.01 ng/mL   Brain Natriuretic Peptide   Result Value Ref Range    Pro-BNP 17,621 (H) 0 - 449 pg/mL   Lactate, Sepsis   Result Value Ref Range    Lactic Acid, Sepsis 2.0 (H) 0.4 - 1.9 mmol/L   Procalcitonin   Result Value Ref Range    Procalcitonin >100.00 (H) 0.00 - 0.15 ng/mL   SPECIMEN REJECTION   Result Value Ref Range    Rejected Test VBG     Reason for Rejection see below    Blood Gas, Venous   Result Value Ref  Range    pH, Raúl 7.260 (L) 7.350 - 7.450    pCO2, Raúl 35.4 (L) 40.0 - 50.0 mmHg    pO2, Raúl 31 Not Established mmHg    HCO3, Venous 16 (L) 23 - 29 mmol/L    Base Excess, Raúl -10.3 Not Established mmol/L    O2 Sat, [address] 52 Not Established %    Carboxyhemoglobin 0.9 %    MetHgb, Raúl 0.5 <1.5 %    TC02 (Calc), Raúl 17 Not Established mmol/L    O2 Therapy Unknown    Urinalysis with Microscopic   Result Value Ref Range    Color, UA Yellow Straw/Yellow    Clarity, UA TURBID (A) Clear    Glucose, Ur Negative Negative mg/dL    Bilirubin Urine SMALL (A) Negative    Ketones, Urine 15 (A) Negative mg/dL    Specific Gravity, UA 1.020 1.005 - 1.030    Blood, Urine LARGE (A) Negative    pH, UA 5.5 5.0 - 8.0    Protein,  (A) Negative mg/dL    Urobilinogen, Urine 0.2 <2.0 E.U./dL    Nitrite, Urine Negative Negative    Leukocyte Esterase, Urine LARGE (A) Negative    Microscopic Examination YES     Urine Type NotGiven    EKG 12 Lead   Result Value Ref Range    Ventricular Rate 110 BPM    QRS Duration 82 ms    Q-T Interval 314 ms    QTc Calculation (Bazett) 424 ms    R Axis 7 degrees    T Axis 157 degrees    Diagnosis       Atrial fibrillation with rapid ventricular responseConfirmed by SCHUYLER TODD, Mook Perez ([id number]) on 2/15/2023 12:25:30 PM         RADIOLOGY  I have reviewed all radiographic studies for this visit. CT CHEST PULMONARY EMBOLISM W CONTRAST   Final Result   Chest      Motion limited. No central pulmonary embolus identified. Hazy ground-glass opacities throughout the lungs, likely   postinflammatory-infectious, greatest in the left lung base. Filling   defects-mucous plugging is seen, greatest in the left lung base. .  When   compared to prior study December 2022 right lung is better aerated overall,   however there is increased consolidative change in the left lung base.,      Abdomen and pelvis      Gas is seen in the collecting system on the right which may be secondary to   recent instrumentation.   However, given the underlying urothelial thickening,   recommend correlation with urinalysis to exclude urinary tract infection. Large stone is seen in the right kidney. Soft tissue thickening overlies the sacrum, compatible with history of   decubitus ulcer. No aggressive bony destruction is seen. .  No drainable   fluid collection noted      Fecal impaction      Severe atherosclerotic narrowing of the abdominal aorta         CT ABDOMEN PELVIS W IV CONTRAST Additional Contrast? None   Final Result   Chest      Motion limited. No central pulmonary embolus identified. Hazy ground-glass opacities throughout the lungs, likely   postinflammatory-infectious, greatest in the left lung base. Filling   defects-mucous plugging is seen, greatest in the left lung base. .  When   compared to prior study December 2022 right lung is better aerated overall,   however there is increased consolidative change in the left lung base.,      Abdomen and pelvis      Gas is seen in the collecting system on the right which may be secondary to   recent instrumentation. However, given the underlying urothelial thickening,   recommend correlation with urinalysis to exclude urinary tract infection. Large stone is seen in the right kidney. Soft tissue thickening overlies the sacrum, compatible with history of   decubitus ulcer. No aggressive bony destruction is seen. .  No drainable   fluid collection noted      Fecal impaction      Severe atherosclerotic narrowing of the abdominal aorta         XR CHEST PORTABLE   Final Result   Limited exam as described above. No acute cardiopulmonary findings              ED COURSE/MDM    76 y.o. female presents with SOB. I suspect the shortness of breath is related to the patient's history of CHF. Her presentation, however, seems most consistent with sepsis. She has a chronic Gurrola in place. This was replaced in the emergency department and the urine appears infectious.   She is marked leukocytosis at 23 and procalcitonin is elevated at greater than 100. Her source might also be related to sacral decubitus ulcer, however urine seems to be the most likely culprit. She was administered vancomycin and cefepime and cultures were ordered. Her lactate is 2 however given history of CHF administered 500 mL normal saline bolus as opposed to full 30 cc/kg bolus. Patient's tachycardia did improve thereafter. Troponin and BNP are both elevated, likely related to history of CHF and also secondary to renal dysfunction. Creatinine 1.5 from baseline around 0.8. VBG shows metabolic acidosis pH 7.54, PCO2 35, base deficit -10. Admit to . Case d/w Dr. Taylor Cassidy via [address] [place] [address]. - History obtained from: patient and granddaughter, present at bedside     - Consults:   IP CONSULT TO HOSPITALIST  IP CONSULT TO PHARMACY      Is this patient to be included in the SEP-1 Core Measure? Yes   SEP-1 CORE MEASURE DATA      Sepsis Criteria   Severe Sepsis Criteria   Septic Shock Criteria     Must be confirmed or suspected to move forward with diagnosis of sepsis. Must meet 2:    [] Temperature > 100.9 F (38.3 C)        or < 96.8 F (36 C)  [x] HR > 90  [] RR > 20  [x] WBC > 12 or < 4 or 10% bands      AND:      [] Infection Confirmed or        Suspected. Must meet 1:    [] Lactate > 2       or   [x] Signs of Organ Dysfunction:    - SBP < 90 or MAP < 65  - Altered mental status  - Creatinine > 2 or increased from      baseline  - Urine Output < 0.5 ml/kg/hr  - Bilirubin > 2  - INR > 1.5 (not anticoagulated)  - Platelets < 712,632  - Acute Respiratory Failure as     evidenced by new need for NIPPV     or mechanical ventilation      [] No criteria met for Severe Sepsis.    Must meet 1:    [] Lactate > 4        or   [] SBP < 90 or MAP < 65 for at        least two readings in the first        hour after fluid bolus        administration      [] Vasopressors initiated (if hypotension persists after fluid resuscitation)        [x] No criteria met for Septic Shock. Patient Vitals for the past 6 hrs:   BP Temp Pulse Resp SpO2   02/15/23 0930 106/87 -- (!) 122 21 (!) 89 %   02/15/23 1115 112/66 -- (!) 101 26 100 %   02/15/23 1130 101/61 -- (!) 108 27 --   02/15/23 1145 (!) 108/51 -- (!) 102 22 98 %   02/15/23 1200 105/64 -- (!) 108 24 (!) 86 %   02/15/23 1233 -- -- -- 22 --   02/15/23 1345 116/74 98.3 °F (36.8 °C) 94 22 100 %      Recent Labs     02/15/23  0956   WBC 22.9*   CREATININE 1.5*   BILITOT 0.4   *         Time Severe Sepsis Identified: 1135    Fluid Resuscitation Rational: less than 30mL/kg because of a history of CHF NYHA III or IV with symptoms with minimal exertion/at rest.  Instead, 500 mL was ordered. More fluid initially would be potentially detrimental to the patient      Repeat lactate level:  stable    Reassessment Exam:   Not applicable. Patient does not have septic shock. Richard Dewitt MD, am the primary clinician of record. During the patient's ED course, the patient was given:  Medications   vancomycin (VANCOCIN) 1250 mg in 250 mL IVPB (has no administration in time range)   0.9 % sodium chloride bolus (500 mLs IntraVENous New Bag 2/15/23 1149)   cefepime (MAXIPIME) 2,000 mg in sodium chloride 0.9 % 50 mL IVPB (mini-bag) (2,000 mg IntraVENous New Bag 2/15/23 1418)   iopamidol (ISOVUE-370) 76 % injection 75 mL (75 mLs IntraVENous Given 2/15/23 1204)        All questions were answered and the patient/family expressed understanding and agreement with the plan. PROCEDURES  None    CRITICAL CARE  Total critical care time provided today thus far was 34 minutes. This excludes seperately billable procedures. Critical care time was provided for severe sepsis that required close evaluation and/or intervention with concern for potential patient decompensation. CLINICAL IMPRESSION  1.  Severe sepsis ([address]        DISPOSITION  Admit  02/15/2023 02:02:04 PM    Patt Grady, MD    Note: This chart was created using voice recognition dictation software. Efforts were made by me to ensure accuracy, however some errors may be present due to limitations of this technology and occasionally words are not transcribed correctly.         Patt Grady MD  02/15/23 0167

## 2023-02-15 NOTE — PROGRESS NOTES
Pt's sister Jose Juan Angeloist not allowed to visit per pt's granddaughter's Sara Ha request. Sara Ha updated that pt will be transferring to [facility]

## 2023-02-16 NOTE — PROGRESS NOTES
Clinical Pharmacy Note  Vancomycin Consult    Junior Troy is a 76 y.o. female ordered Vancomycin for CAP; consult received from Dr. Trisha Lewis to manage therapy. Also receiving cefepime. Allergies:  Demerol hcl [meperidine] and Morphine     Temp max:  Temp (24hrs), Av °F (36.7 °C), Min:97.5 °F (36.4 °C), Max:98.5 °F (36.9 °C)      Recent Labs     02/15/23  0956   WBC 22.9*       Recent Labs     02/15/23  0956   BUN 74*   CREATININE 1.5*         Intake/Output Summary (Last 24 hours) at 2023 0693  Last data filed at 2023 0556  Gross per 24 hour   Intake --   Output 270 ml   Net -270 ml       Culture Results:  Pending    Ht Readings from Last 1 Encounters:   02/15/23 5' 5\" (1.651 m)        Wt Readings from Last 1 Encounters:   23 151 lb 14.4 oz (68.9 kg)         Estimated Creatinine Clearance: 30 mL/min (A) (based on SCr of 1.5 mg/dL (H)). Assessment:  Day # 2 of vancomycin. Current regimen: Intermittent dosing based on levels  Vancomycin level: 15.4 mg/L      Plan: Will dose vancomycin 750 mg IV x 1 and recheck a random level tomorrow AM.      Thank you for the consult.      Ruth Heart [facility]  2023 9:30 AM

## 2023-02-16 NOTE — CONSULTS
I spoke with granddaughter. Patient enrolling in hospice today. Consult deferred.     Betsey Butler DO  [facility], Sleep [facility]  [phone]

## 2023-02-16 NOTE — PROGRESS NOTES
RN received call from Lona Jones stating pt oxygen was dropping into 70s. Staff entered room to assess, pt not responsive. Supplemental oxygen increased, without success. Pulse slowly dropped from 47 into the 30s at that time. RN obtain VS, without success. Second RN Santos Malloy) approaching to assist, oxygen continued to decline as well as pulse slowly dropping into single digits. VS ceased at 1300, verified by 2 Rns. RN called DEE Rodriguez pronounced at 1300. Post-mortem care began. RN called SW to notify as well as called family member. Granddaughter on her way up to hospital for visit. RN allowing family to visit after post-mortem care is completed. Electronically signed by Leyda Hidalgo RN on 2/16/2023 at 1:12 PM    [phone] Family leaving room at this time. Pt placed in body bag, per protocol. Toe tag applied. Electronically signed by Leyda Hidalgo RN on 2/16/2023 at [address] was notified at [phone]. Spoke with Patric Siu, case # [id number]. RN notified security at 0472. Electronically signed by Leyda Hidalgo RN on 2/16/2023 at 4:17 PM    [phone]- security arriving at this time to remove body from room.   Electronically signed by Leyda Hidalgo RN on 2/16/2023 at 4:28 PM

## 2023-02-16 NOTE — CARE COORDINATION
[facility]  Consult Note       NAME:  Sabine Ewing RECORD NUMBER:  [id number]  AGE: 76 y.o. GENDER: female  : 1945  TODAY'S DATE:  2023    Subjective   Reason for WOCN Evaluation and Assessment: sacral wound, stage 4, POA      Scarlett Retana is a 76 y.o. female referred by:   [] Physician  [x] Nursing  [] Other:     Wound Identification:  Wound Type: pressure  Contributing Factors: chronic pressure, decreased mobility, and shear force    Wound History: chronic, POA  Current Wound Care Treatment:  moist to moist    Patient Goal of Care:  [x] Wound Healing  [] Odor Control  [] Palliative Care  [] Pain Control   [] Other:         PAST MEDICAL HISTORY        Diagnosis Date    Acid reflux     Anxiety     Depression     Hiatal hernia     Hyperlipidemia     Hypertension     [facility]    Thyroid disease     Vertigo        PAST SURGICAL HISTORY    Past Surgical History:   Procedure Laterality Date    ANGIOPLASTY      CARDIAC SURGERY      bypass and stent    HYSTERECTOMY (CERVIX STATUS UNKNOWN)         FAMILY HISTORY    History reviewed. No pertinent family history. SOCIAL HISTORY    Social History     Tobacco Use    Smoking status: Never    Smokeless tobacco: Never   Vaping Use    Vaping Use: Never used   Substance Use Topics    Alcohol use: Not Currently    Drug use: Not Currently       ALLERGIES    Allergies   Allergen Reactions    Demerol Hcl [Meperidine] Palpitations    Morphine Palpitations       MEDICATIONS    No current facility-administered medications on file prior to encounter. No current outpatient medications on file prior to encounter.        Objective    BP (!) 85/38   Pulse (!) 101   Temp 97.8 °F (36.6 °C) (Axillary)   Resp 20   Ht 5' 5\" (1.651 m)   Wt 151 lb 14.4 oz (68.9 kg)   SpO2 93%   BMI 25.28 kg/m²     LABS:  WBC:    Lab Results   Component Value Date/Time    WBC 22.9 02/15/2023 09:56 AM     H/H: Lab Results   Component Value Date/Time    HGB 9.4 02/15/2023 09:56 AM    HCT 30.8 02/15/2023 09:56 AM     PTT:    Lab Results   Component Value Date/Time    APTT 32.0 03/16/2014 09:50 PM   [APTT}  PT/INR:    Lab Results   Component Value Date/Time    PROTIME 11.5 03/16/2014 09:50 PM    INR 1.07 03/16/2014 09:50 PM     HgBA1c:  No results found for: LABA1C    Assessment   Fredi Risk Score: Fredi Scale Score: 9    Patient Active Problem List   Diagnosis Code    Sepsis ([facility] A41.9    HAP (hospital-acquired pneumonia) J18.9, Y95    Decubital ulcer L89.90       Measurements:      Wound 12/13/22 Coccyx Mid tunneling wound on coccyx pink red in color (Active)   Wound Image   02/16/23 0930   Wound Etiology Pressure Stage 4 02/16/23 0930   Dressing Status New dressing applied;Clean;Dry; Intact 02/16/23 0930   Wound Cleansed Cleansed with saline 02/16/23 0930   Dressing/Treatment Moist to dry;Silicone border 52/25/68 1942   Dressing Change Due 02/19/23 02/16/23 0930   Wound Length (cm) 17 cm 02/16/23 0930   Wound Width (cm) 13 cm 02/16/23 0930   Wound Depth (cm) 3 cm 02/16/23 0930   Wound Surface Area (cm^2) 221 cm^2 02/16/23 0930   Change in Wound Size % (l*w) -1127.78 02/16/23 0930   Wound Volume (cm^3) 663 cm^3 02/16/23 0930   Wound Healing % -3583 02/16/23 0930   Wound Assessment Purple/maroon;Pink/red; Exposed structure bone 02/16/23 0930   Drainage Amount Moderate 02/16/23 0930   Drainage Description Serosanguinous 02/16/23 0930   Odor None 02/16/23 0930   Melvi-wound Assessment Blanchable erythema;Fragile 02/16/23 0930   Margins Undefined edges 02/16/23 0930   Number of days: 64      Pt seen. Family at bedside, state the want pt to be comfortable and are going back to [facility] with hospice today. Wound bed is purple, maroon, bone exposed. Odor not noted at this time. Pt is on antibiotics. For comfort, would try Aquacel ag and foam border with dressing changes every 3 days.  Could cleanse wound with Vashe at dressing changes. If family changes mind and want more aggressive care, could consider surgical eval.      Response to treatment:  Well tolerated by patient. Plan   Plan of Care:   Sacral wound-Aquacel ag and foam border, change every 3 days.   Specialty Bed Required : Yes   [] Low Air Loss   [] Pressure Redistribution  [] Fluid Immersion  [] Bariatric  [] Total Pressure Relief  [] Other:     Current Diet: Diet NPO  Dietician consult:  No    Discharge Plan:  Placement for patient upon discharge: skilled nursing    Patient appropriate for Outpatient [facility]: No    Referrals:  [x]   [] [facility]  [] Supplies  [x] Other-palliative care    Patient/Caregiver Teaching:  Level of patient/caregiver understanding able to:   [] Indicates understanding       [] Needs reinforcement  [] Unsuccessful      [x] Verbal Understanding  [] Demonstrated understanding       [] No evidence of learning  [] Refused teaching         [] N/A       Electronically signed by Georgette Bower on 2/16/2023 at 10:36 AM

## 2023-02-16 NOTE — PROGRESS NOTES
Admission completed as allowed d/t pt's AMS- no family at bedside. Unable to complete med list- no list sent from nursing home. Orders discussed with Dr. Jacqueline Danielle- no f/u VBG needed or bipap at this time. Electronically signed by Ronit Vega RN on 2/15/23 at 7:10 PM EST    Family arrived to bedside- requesting to speak with Dr. Jacqueline Danielle.  Perfect serve to Dr. Jacqueline Danielle  Electronically signed by Ronit Vega RN on 2/15/23 at 7:13 PM EST

## 2023-02-16 NOTE — PROGRESS NOTES
ADVANCED CARE PLANNING    Name:Marcela Erickson       :  1945              MRN:  [id number]  Admission Date: 2/15/2023  8:39 AM  Date of Discussion:  2.15.23      Purpose of Encounter: Advanced care planning in light of declining health status. Parties in attendance: :Pauline Vail MD, Family members: Patient's granddaughter Shruthi Frazier and . Decisional Capacity:No      Objective/Medical Story: 57-year-old chronically female admitted to the hospital with sepsis and acute respiratory failure. Has large sacral decubitus ulcer. Currently on BiPAP mode ventilation. She is obtunded. On broad-spectrum antibiotics. Very ill      Active Diagnoses: Active Hospital Problems    Diagnosis Date Noted    Sepsis [facility]) [A41.9] 2022     Priority: Medium       These active diagnoses are of sufficient risk that focused discussion on advance care planning is indicated to allow the patient to thoughtfully consider personal goals of care; and if situations arise that prevent the ability to personally give input, to ensure appropriate representation of their personal desires for different levels and aggressiveness of care. Goals of Care Determinations: Patient family wishes to focus on keeping their family member comfortable  Code Status: Currently family patient wishes to be DNR. Would like to enroll in hospice and be discharged tomorrow back to the nursing home with hospice care. They want limitation of all aggressive treatments and just comfort         Time Spent on Advanced Planning Documents: 16 mins. The following items were considered in medical decision making:  Independent review of images , Review / order clinical lab tests. Review / order radiology tests, Decision to obtain old records. Advanced Care Planning Documents:15. Completed advances directives, advanced directives in chart.         Electronically signed by Lena Flores MD     Thank you Wil Hartley for the opportunity to be involved in this patient's care. If you have any questions or concerns, please feel free to contact me at [phone].

## 2023-02-16 NOTE — CARE COORDINATION
Spoke to Archana Mortensen with [facility] [phone]. Explains patient is still active with hospice and she can return to [place] today. Requests a call when transport is set and agency will have the hospice RN meet at facility. Need to fax Deisi Chase upon completion to hospice at [phone]. Spoke granddaughter Jay Adams to discuss return to [facility]/ hospice, await return call. Electronically signed by Daniel Gutiérrez RN on 2/16/2023 at 9:46 AM      Spoke to granddaughter/POA Jay Adams, confirms she wants her grandmother to discharge today to [place] with [facility]. Messaged MD for dc order. Transport made for 1:45pm via "[employer].  Family, RN, facility, & hospice all made aware.     Electronically signed by Daniel Gutiérrez RN on 2/16/2023 at 9:59 AM

## 2023-02-16 NOTE — DISCHARGE INSTR - COC
Continuity of Care Form    Patient Name: Vaishnavi Gayle   :  1945  MRN:  [id number]    Admit date:  2/15/2023  Discharge date:  2023    Code Status Order: DNR-CC   Advance Directives:     Admitting Physician:  Mariza Donald MD  PCP: Bethany Herndon    Discharging Nurse: [facility] Unit/Room#: I5I-3772/8639-61  Discharging Unit Phone Number: [phone]    Emergency Contact:   Extended Emergency Contact Information  Primary Emergency Contact: [address] [place] [address] Phone: [phone]  Mobile Phone: [phone]  Relation: Child  Secondary Emergency Contact: [address] [place] [address] Phone: [phone]  Relation: Grandchild  Preferred language: English   needed?  No    Past Surgical History:  Past Surgical History:   Procedure Laterality Date    ANGIOPLASTY      CARDIAC SURGERY      bypass and stent    HYSTERECTOMY (CERVIX STATUS UNKNOWN)         Immunization History:   Immunization History   Administered Date(s) Administered    COVID-19, PFIZER Bivalent BOOSTER, DO NOT Dilute, (age 12y+), IM, 30 mcg/0.3 mL 10/20/2022    COVID-19, PFIZER PURPLE top, DILUTE for use, (age 15 y+), 30mcg/0.3mL 03/15/2021, 2021       Active Problems:  Patient Active Problem List   Diagnosis Code    Sepsis ([facility] A41.9    HAP (hospital-acquired pneumonia) J18.9, Y95    Decubital ulcer L89.90       Isolation/Infection:   Isolation            No Isolation          Patient Infection Status       Infection Onset Added Last Indicated Last Indicated By Review Planned Expiration Resolved Resolved By    None active    Resolved    COVID-19 (Rule Out) 02/15/23 02/15/23 02/15/23 COVID-19, Rapid (Ordered)   02/15/23 Rule-Out Test Resulted    COVID-19 (Rule Out) 22 COVID-19, Rapid (Ordered)   22 Rule-Out Test Resulted    C-diff Rule Out 22 Clostridium Difficile Toxin/Antigen (Ordered)   22 Rule-Out Test Resulted            Nurse Assessment:  Last Vital Signs: BP (!) 85/38   Pulse (!) 101   Temp 97.8 °F (36.6 °C) (Axillary)   Resp 20   Ht 5' 5\" (1.651 m)   Wt 151 lb 14.4 oz (68.9 kg)   SpO2 93%   BMI 25.28 kg/m²     Last documented pain score (0-10 scale): Pain Level: 6  Last Weight:   Wt Readings from Last 1 Encounters:   02/16/23 151 lb 14.4 oz (68.9 kg)     Mental Status:   Alert at times, coherence comes and goes, drifts of mid-conversation at times    IV Access:  - None  - Removed PIV from right cephalic vein    Nursing Mobility/ADLs:  Walking   Dependent  Transfer  Dependent  Bathing  Dependent  Dressing  Dependent  Toileting  Dependent  Feeding  Dependent  Med Admin  Dependent  Med Delivery    has been NPO, no oral meds given    Wound Care Documentation and Therapy:  Wound 12/13/22 Coccyx Mid tunneling wound on coccyx pink red in color (Active)   Dressing Status Clean;Dry; Intact 02/15/23 1942   Wound Cleansed Wound cleanser 02/15/23 1847   Dressing/Treatment Moist to dry;Silicone border 10/25/73 1942   Wound Assessment Purple/maroon;Pink/red; Exposed structure bone 02/15/23 1847   Drainage Amount Moderate 02/15/23 1847   Drainage Description Purulent 02/15/23 1847   Odor Moderate 02/15/23 1847   Margins Undefined edges 02/15/23 1847   Number of days: 64     Sacral wound-cleanse with vashe. Lightly pack with aquacel ag and cover with foam border     Elimination:  Continence: Bowel: No  Bladder: N/A  Urinary Catheter: Last Change Date 02/15/2023    Colostomy/Ileostomy/Ileal Conduit: No       Date of Last BM: 02/16/2023    Intake/Output Summary (Last 24 hours) at 2/16/2023 0959  Last data filed at 2/16/2023 0556  Gross per 24 hour   Intake --   Output 270 ml   Net -270 ml     I/O last 3 completed shifts:  In: -   Out: 270 [Urine:270]    Safety Concerns:      At Risk for Falls and Aspiration Risk    Impairments/Disabilities:      None    Nutrition Therapy:  Current Nutrition Therapy:   - NPO    Routes of Feeding: Oral  Liquids: NPO  Daily Fluid Restriction: no  Last Modified Barium Swallow with Video (Video Swallowing Test): not done    Treatments at the Time of Hospital Discharge:   Respiratory Treatments: N/A  Oxygen Therapy:  is on oxygen at 4 L/min per nasal cannula. Ventilator:    - No ventilator support    Rehab Therapies: N/A  Weight Bearing Status/Restrictions: No weight bearing restrictions  Other Medical Equipment (for information only, NOT a DME order):  wheelchair  Other Treatments: WOUND CARE TO BUTTOCKS, LEFT HEEL AND HAS MULTIPLE SKIN TEARS TO BUE    Patient's personal belongings (please select all that are sent with patient): ALL BELONGINGS    RN SIGNATURE:  Electronically signed by Kelsea Lozada RN on 2/16/23 at 11:59 AM EST    CASE MANAGEMENT/SOCIAL WORK SECTION    Inpatient Status Date: 2/15/23    Readmission Risk Assessment Score:  Readmission Risk              Risk of Unplanned Readmission:  14           Discharging to Facility/ Agency   Name: Shy Castano with hospice  Address:  [address] [place] [address]   Phone:  [phone]  Fax:  [phone]      [facility]  [facility]  [phone]  YFO:[phone]    / signature: Electronically signed by Luis Meza RN Case Management on 2/16/23 at 10:00 AM EST    PHYSICIAN SECTION    Prognosis: {Prognosis:9276909935}    Condition at Discharge: Tejal Ansari Patient Condition:509190657}    Rehab Potential (if transferring to Rehab): {Prognosis:9018234613}    Recommended Labs or Other Treatments After Discharge: ***    Physician Certification: I certify the above information and transfer of Lisney Schmid  is necessary for the continuing treatment of the diagnosis listed and that she requires {Admit to Appropriate Level of Care:75028} for {GREATER/LESS:040621142} 30 days.      Update Admission H&P: {CHP DME Changes in EWMRX:627226014}    PHYSICIAN SIGNATURE:  {Esignature:[id number]}

## 2023-02-16 NOTE — CARE COORDINATION
CASE MANAGEMENT DISCHARGE SUMMARY:    DISCHARGE DATE: 2/16/23    DISCHARGED TO:    Name: Emi Baeza [facility]  Address:  [address] [place] [address]   Report Number:  [phone]  Fax:  [phone]    [facility] AGENCY:   Name: [facility]  Phone: [phone]  Fax: [phone]     TRANSPORTATION: [address] [place] [address] Avenue: 1:45pm     INSURANCE PRECERT OBTAINED: n/a    HENS/PASAAR COMPLETED: n/a, return long term care    COMMENTS: Await official dc order. Granddaughter/POA Shruthi Frazier, [facility], facility, & bedside RN [place] aware of dc plan.     Electronically signed by Shantell Sargent RN Case Management [phone] on 2/16/2023 at 10:10 AM

## 2023-02-16 NOTE — PLAN OF CARE
Problem: Discharge Planning  Goal: Discharge to home or other facility with appropriate resources  Outcome: Progressing  Flowsheets (Taken 2/15/2023 1725 by Jesus Quick RN)  Discharge to home or other facility with appropriate resources: Identify barriers to discharge with patient and caregiver     Problem: Pain  Goal: Verbalizes/displays adequate comfort level or baseline comfort level  Outcome: Progressing     Problem: Safety - Adult  Goal: Free from fall injury  Outcome: Progressing     Problem: ABCDS Injury Assessment  Goal: Absence of physical injury  Outcome: Progressing     Problem: Skin/Tissue Integrity  Goal: Absence of new skin breakdown  Description: 1. Monitor for areas of redness and/or skin breakdown  2. Assess vascular access sites hourly  3. Every 4-6 hours minimum:  Change oxygen saturation probe site  4. Every 4-6 hours:  If on nasal continuous positive airway pressure, respiratory therapy assess nares and determine need for appliance change or resting period. Outcome: Progressing     Problem: Confusion  Goal: Confusion, delirium, dementia, or psychosis is improved or at baseline  Description: INTERVENTIONS:  1. Assess for possible contributors to thought disturbance, including medications, impaired vision or hearing, underlying metabolic abnormalities, dehydration, psychiatric diagnoses, and notify attending LIP  2. [place] high risk fall precautions, as indicated  3. Provide frequent short contacts to provide reality reorientation, refocusing and direction  4. Decrease environmental stimuli, including noise as appropriate  5. Monitor and intervene to maintain adequate nutrition, hydration, elimination, sleep and activity  6. If unable to ensure safety without constant attention obtain sitter and review sitter guidelines with assigned personnel  7.  Initiate Psychosocial CNS and Spiritual Care consult, as indicated  Outcome: Progressing

## 2023-02-17 LAB — REPORT: NORMAL

## 2023-02-19 LAB
BLOOD CULTURE, ROUTINE: ABNORMAL
BLOOD CULTURE, ROUTINE: ABNORMAL
CULTURE, BLOOD 2: ABNORMAL
ORGANISM: ABNORMAL
ORGANISM: ABNORMAL

## 2023-02-21 LAB
CULTURE, BLOOD 2: ABNORMAL
ORGANISM: ABNORMAL